# Patient Record
Sex: MALE | Race: WHITE | NOT HISPANIC OR LATINO | Employment: FULL TIME | URBAN - METROPOLITAN AREA
[De-identification: names, ages, dates, MRNs, and addresses within clinical notes are randomized per-mention and may not be internally consistent; named-entity substitution may affect disease eponyms.]

---

## 2018-02-05 ENCOUNTER — OFFICE VISIT (OUTPATIENT)
Dept: URGENT CARE | Facility: CLINIC | Age: 48
End: 2018-02-05
Payer: COMMERCIAL

## 2018-02-05 VITALS
WEIGHT: 315 LBS | SYSTOLIC BLOOD PRESSURE: 106 MMHG | TEMPERATURE: 97 F | BODY MASS INDEX: 40.43 KG/M2 | HEIGHT: 74 IN | DIASTOLIC BLOOD PRESSURE: 63 MMHG | HEART RATE: 58 BPM

## 2018-02-05 DIAGNOSIS — S62.631B OPEN DISPLACED FRACTURE OF DISTAL PHALANX OF LEFT INDEX FINGER, INITIAL ENCOUNTER: ICD-10-CM

## 2018-02-05 DIAGNOSIS — S61.319A LACERATION OF NAIL BED OF FINGER, INITIAL ENCOUNTER: ICD-10-CM

## 2018-02-05 DIAGNOSIS — Z02.83 ENCOUNTER FOR DRUG SCREENING: Primary | ICD-10-CM

## 2018-02-05 LAB
CTP QC/QA: YES
POC 5 PANEL DRUG SCREEN: NEGATIVE

## 2018-02-05 PROCEDURE — 3008F BODY MASS INDEX DOCD: CPT | Mod: S$GLB,,, | Performed by: PREVENTIVE MEDICINE

## 2018-02-05 PROCEDURE — 80305 DRUG TEST PRSMV DIR OPT OBS: CPT | Mod: QW,S$GLB,, | Performed by: PREVENTIVE MEDICINE

## 2018-02-05 PROCEDURE — 99204 OFFICE O/P NEW MOD 45 MIN: CPT | Mod: S$GLB,,, | Performed by: PREVENTIVE MEDICINE

## 2018-02-05 RX ORDER — CIPROFLOXACIN 500 MG/1
500 TABLET ORAL 2 TIMES DAILY
Qty: 20 TABLET | Refills: 0 | Status: SHIPPED | OUTPATIENT
Start: 2018-02-05 | End: 2021-05-12 | Stop reason: CLARIF

## 2018-02-05 RX ORDER — HYDROCODONE BITARTRATE AND ACETAMINOPHEN 5; 325 MG/1; MG/1
1 TABLET ORAL EVERY 6 HOURS PRN
Qty: 30 TABLET | Refills: 0 | Status: SHIPPED | OUTPATIENT
Start: 2018-02-05 | End: 2021-05-12 | Stop reason: CLARIF

## 2018-02-05 NOTE — PROGRESS NOTES
Subjective:       Patient ID: Antonino Austin is a 47 y.o. male.    Chief Complaint: Laceration and Hand Pain    NEW INJURY (02/05/2018):  Patient comes in today for laceration to left index finger. He states he was trying to secure the tailgate on his truck and his finger got caught in the clasp. He states that the whole nail is missing off of finger. He has his finger wrapped in ice and says the pain is tolerable. -LM      Laceration    The incident occurred 1 to 3 hours ago. The laceration is located on the left hand. The laceration mechanism was a metal edge. The pain is at a severity of 7/10. The pain is moderate. The pain has been constant since onset. He reports no foreign bodies present. His tetanus status is out of date.   Hand Pain    The incident occurred 1 to 3 hours ago. The incident occurred at work. The pain is present in the left fingers. The quality of the pain is described as shooting. The pain does not radiate. The pain is at a severity of 7/10. The pain is moderate. The pain has been constant since the incident. The symptoms are aggravated by movement. He has tried ice for the symptoms. The treatment provided mild relief.     Review of Systems   All other systems reviewed and are negative.      Objective:      Physical Exam   Constitutional: He is oriented to person, place, and time. He appears well-developed and well-nourished.   HENT:   Head: Normocephalic.   Eyes: Pupils are equal, round, and reactive to light.   Neck: Normal range of motion.   Cardiovascular: Normal rate.    Pulmonary/Chest: Effort normal.   Musculoskeletal: Normal range of motion.        Hands:  Marked swelling and laceration of the distal pad and nail bed of left index finger with almost complete amputation of the distal aspect of the finger. Very limited range of motion of left index finger due to significant laceration and fracture.   Neurological: He is alert and oriented to person, place, and time.   Skin: Skin is warm  and dry.   Psychiatric: He has a normal mood and affect.   Nursing note and vitals reviewed.      Assessment:       1. Encounter for drug screening    2. Open displaced fracture of distal phalanx of left index finger, initial encounter        Plan:

## 2018-02-05 NOTE — LETTER
[facility] [place]  [address], [address]  [place] [address]  Phone: [phone]  Fax: [phone]    Pt Name: Antonino Austin  Injury Date: 02/05/2018   Employee ID: [id number] Date of First Treatment: 02/05/2018   Company: [employer]            Appointment Time: 09:20 AM Arrived:  9:35 AM CST   Physician: Corey Reis MD Time Out:14:05 PM       Office Treatment: Antonino was seen today for laceration and hand pain.    Diagnoses and all orders for this visit:    Encounter for drug screening  -     POCT Rapid Drug Screen 5 Panel    Open displaced fracture of distal phalanx of left index finger, initial encounter  -     X-Ray Hand 3 view Left; Future  -     X-Ray Hand 3 View Left; Future  -     diptheria-tetanus toxoids 2-2 Lf unit/0.5 mL injection 0.5 mL; Inject 0.5 mLs into the muscle one time.  -     ciprofloxacin HCl (CIPRO) 500 MG tablet; Take 1 tablet (500 mg total) by mouth 2 (two) times daily.  -     hydrocodone-acetaminophen 5-325mg (NORCO) 5-325 mg per tablet; Take 1 tablet by mouth every 6 (six) hours as needed for Pain.       Patient Instructions: Keep dressing clean/dry/covered, Elevated affected area, Attention not to aggravate affected area, Use splint as directed    Restrictions: Home today, Limited use of left hand and arm, No lifting/pushing/pulling more than 10 lbs (Avoid getting wound site left index finger wet at work)       Return Appointment: 2/14/2018 at 0930 AM

## 2018-02-05 NOTE — PROGRESS NOTES
Subjective:       Patient ID: Antonino Austin is a 47 y.o. male.    Chief Complaint: Laceration and Hand Pain    NEW INJURY (02/05/2018):  Patient comes in today for laceration to left index finger. He states he was trying to secure the tailgate on his truck and his finger got caught in the clasp. He states that the whole nail is missing off of finger. He has his finger wrapped in ice and says the pain is tolerable. -LM      Laceration    The incident occurred 1 to 3 hours ago. The laceration is located on the left hand. The laceration mechanism was a metal edge. The pain is at a severity of 7/10. The pain is moderate. The pain has been constant since onset. He reports no foreign bodies present. His tetanus status is out of date.   Hand Pain    The incident occurred 1 to 3 hours ago. The incident occurred at work. The pain is present in the left fingers. The quality of the pain is described as shooting. The pain does not radiate. The pain is at a severity of 7/10. The pain is moderate. The pain has been constant since the incident. The symptoms are aggravated by movement. He has tried ice for the symptoms. The treatment provided mild relief.     Review of Systems   All other systems reviewed and are negative.      Objective:      Physical Exam   Constitutional: He is oriented to person, place, and time. He appears well-developed and well-nourished.   HENT:   Head: Normocephalic.   Eyes: Pupils are equal, round, and reactive to light.   Neck: Normal range of motion.   Cardiovascular: Normal rate.    Pulmonary/Chest: Effort normal.   Musculoskeletal: Normal range of motion.        Hands:  Marked swelling and laceration of the distal pad and nail bed of left index finger with almost complete amputation of the distal aspect of the finger. Very limited range of motion of left index finger due to significant laceration and fracture.   Neurological: He is alert and oriented to person, place, and time.   Skin: Skin is warm  and dry.   Psychiatric: He has a normal mood and affect.   Nursing note and vitals reviewed.      Assessment:       1. Encounter for drug screening    2. Open displaced fracture of distal phalanx of left index finger, initial encounter        Plan:       ***

## 2018-02-14 ENCOUNTER — OFFICE VISIT (OUTPATIENT)
Dept: URGENT CARE | Facility: CLINIC | Age: 48
End: 2018-02-14
Payer: COMMERCIAL

## 2018-02-14 DIAGNOSIS — S62.631B OPEN DISPLACED FRACTURE OF DISTAL PHALANX OF LEFT INDEX FINGER, INITIAL ENCOUNTER: Primary | ICD-10-CM

## 2018-02-14 PROCEDURE — 3008F BODY MASS INDEX DOCD: CPT | Mod: S$GLB,,, | Performed by: PREVENTIVE MEDICINE

## 2018-02-14 PROCEDURE — 99024 POSTOP FOLLOW-UP VISIT: CPT | Mod: S$GLB,,, | Performed by: PREVENTIVE MEDICINE

## 2018-02-14 NOTE — LETTER
[facility] [place]  [address], [address]  [facility] [address]  Phone: [phone]  Fax: [phone]    Pt Name: Antonino Austin  Injury Date: 02/05/2018   Employee ID: [id number] Date : 02/14/2018   Company: [employer]            Appointment Time: 09:15 AM Arrived:  9:30 AM CST   Physician: Corey Reis MD        Office Treatment: Antonino was seen today for hand pain.    Diagnoses and all orders for this visit:    Open displaced fracture of distal phalanx of left index finger, initial encounter  -     X-Ray Hand 3 view Left; Future       Patient Instructions: Keep dressing clean/dry/covered, Use splint as directed, (All sutures removed and steri strips applied.  Bandage and MG applied)      Restrictions:  Attention not to aggravate affected area  Regular Duty  May drive company vehicles         Return Appointment: 2/16/2018 at 12:30 PM

## 2018-02-14 NOTE — PROGRESS NOTES
Subjective:       Patient ID: Antonino Austin is a 47 y.o. male.    Chief Complaint: Hand Pain (Left idex finger)    F/u left index finger fracture and laceration (doi 2/5/2018)       Hand Pain    The incident occurred more than 1 week ago. The incident occurred at work. The pain is present in the left fingers. The quality of the pain is described as stabbing. The pain is at a severity of 3/10. The pain is moderate. The pain has been fluctuating since the incident. Pertinent negatives include no chest pain or numbness. The symptoms are aggravated by palpation and movement. He has tried NSAIDs for the symptoms. The treatment provided moderate relief.     Review of Systems   Constitution: Negative for chills, fever and weakness.   HENT: Negative for congestion, ear pain, nosebleeds and tinnitus.    Eyes: Negative for blurred vision and pain.   Cardiovascular: Negative for chest pain and palpitations.   Respiratory: Negative for cough, shortness of breath and wheezing.    Hematologic/Lymphatic: Does not bruise/bleed easily.   Skin: Positive for nail changes. Negative for dry skin, itching, poor wound healing, rash and skin cancer.   Musculoskeletal: Negative for back pain, joint pain, muscle weakness, neck pain and stiffness.   Gastrointestinal: Negative for abdominal pain, constipation, diarrhea, nausea and vomiting.   Genitourinary: Negative for dysuria and hematuria.   Neurological: Negative for dizziness, headaches, numbness and seizures.   Allergic/Immunologic: Negative for hives.   All other systems reviewed and are negative.      Objective:      Physical Exam   Constitutional: He is oriented to person, place, and time. He appears well-developed and well-nourished.   HENT:   Head: Normocephalic and atraumatic.   Eyes: EOM are normal. Pupils are equal, round, and reactive to light.   Neck: Normal range of motion.   Cardiovascular: Normal rate.    Pulmonary/Chest: Effort normal.   Musculoskeletal:        Left  hand: He exhibits decreased range of motion, tenderness, bony tenderness, laceration and swelling. He exhibits normal capillary refill and no deformity.        Hands:  Wound site tip of left index finger healing well. No signs of infection. Now has pain with limited range of motion of the distal aspect of left index finger.   Neurological: He is alert and oriented to person, place, and time.   Skin: Skin is warm and dry.   Psychiatric: He has a normal mood and affect.   Nursing note and vitals reviewed.    repeat xrays left hand reveal fracture site of the distal phalanx healing in good position.  Assessment:       1. Open displaced fracture of distal phalanx of left index finger, initial encounter        Plan:            Patient Instructions: Keep dressing clean/dry/covered, Use splint as directed, Attention not to aggravate affected area (All sutures removed and steri strips applied.  Bandage and MG applied)   Restrictions:  (May drive company vehicles.)  Follow-up in about 2 days (around 2/16/2018).

## 2018-02-14 NOTE — LETTER
[facility] [place] [address], [address]  [facility] [address]  Phone: [phone]  Fax: [phone]    Pt Name: Antonino Austin  Injury Date: 02/05/2018   Employee ID: [id number] Date  02/14/2018   Company: [employer]            Appointment Time: 09:15 AM Arrived:  9:30 AM CST   Physician: Corey Reis MD Time out: 11:58 AM       Office Treatment: Antonino was seen today for hand pain.    Diagnoses and all orders for this visit:    Open displaced fracture of distal phalanx of left index finger, initial encounter  -     X-Ray Hand 3 view Left; Future       Patient Instructions: Keep dressing clean/dry/covered, Use splint as directed, (All sutures removed and steri strips applied.  Bandage and MG applied)      Restrictions:  Attention not to aggravate affected area  Regular Duty  May drive company vehicles       Return Appointment: 2/16/2018 at 12:30 PM

## 2018-02-16 ENCOUNTER — OFFICE VISIT (OUTPATIENT)
Dept: URGENT CARE | Facility: CLINIC | Age: 48
End: 2018-02-16
Payer: COMMERCIAL

## 2018-02-16 DIAGNOSIS — S62.631D OPEN DISPLACED FRACTURE OF DISTAL PHALANX OF LEFT INDEX FINGER WITH ROUTINE HEALING, SUBSEQUENT ENCOUNTER: Primary | ICD-10-CM

## 2018-02-16 DIAGNOSIS — S61.319D LACERATION OF FINGER NAIL BED, SUBSEQUENT ENCOUNTER: ICD-10-CM

## 2018-02-16 PROCEDURE — 99213 OFFICE O/P EST LOW 20 MIN: CPT | Mod: S$GLB,,, | Performed by: PREVENTIVE MEDICINE

## 2018-02-16 PROCEDURE — 3008F BODY MASS INDEX DOCD: CPT | Mod: S$GLB,,, | Performed by: PREVENTIVE MEDICINE

## 2018-02-16 NOTE — PROGRESS NOTES
Subjective:       Patient ID: Antonino Austin is a 47 y.o. male.    Chief Complaint: Hand Pain (left index finger)    F/u left index finger fracture and laceration (doi 2/5/2018)       Hand Pain    The incident occurred more than 1 week ago. The incident occurred at work. The pain is present in the left fingers. The quality of the pain is described as stabbing and aching. The pain is at a severity of 2/10. The pain is mild. The pain has been intermittent since the incident. Pertinent negatives include no chest pain. He has tried NSAIDs for the symptoms. The treatment provided moderate relief.     Review of Systems   Constitution: Negative for chills and fever.   HENT: Negative for sore throat.    Eyes: Negative for blurred vision.   Cardiovascular: Negative for chest pain.   Respiratory: Negative for shortness of breath.    Skin: Negative for rash.   Musculoskeletal: Negative for back pain and joint pain.   Gastrointestinal: Negative for abdominal pain, diarrhea, nausea and vomiting.   Neurological: Negative for headaches.   Psychiatric/Behavioral: The patient is not nervous/anxious.    All other systems reviewed and are negative.      Objective:      Physical Exam   Constitutional: He is oriented to person, place, and time. He appears well-developed and well-nourished.   HENT:   Head: Normocephalic and atraumatic.   Eyes: EOM are normal. Pupils are equal, round, and reactive to light.   Neck: Normal range of motion.   Cardiovascular: Normal rate.    Pulmonary/Chest: Effort normal.   Musculoskeletal:        Left hand: He exhibits decreased range of motion, tenderness, bony tenderness, laceration and swelling. He exhibits normal capillary refill and no deformity.        Hands:  Wound site tip of left index finger healing well. No signs of infection. Now has pain with limited range of motion of the distal aspect of left index finger.   Neurological: He is alert and oriented to person, place, and time.   Skin: Skin is  warm and dry.   Psychiatric: He has a normal mood and affect.   Nursing note and vitals reviewed.      Assessment:       1. Open displaced fracture of distal phalanx of left index finger with routine healing, subsequent encounter    2. 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 Keep wound area clean and dry at work.)  Follow-up in about 5 days (around 2/21/2018).

## 2018-02-16 NOTE — LETTER
[facility] [place] [address], [address]  [place] [address]  Phone: [phone]  Fax: [phone]    Pt Name: Antonino Austin  Injury Date: 02/05/2018   Employee ID: [id number] Date: 02/16/2018   Company:[[employer]            Appointment Time: 12:130 PM Arrived:  11:01 AM   Physician: Corey Reis MD Time out: 12:09 PM       Office Treatment: Antonino was seen today for hand pain.    Diagnoses and all orders for this visit:    Open displaced fracture of distal phalanx of left index finger with routine healing, subsequent encounter    Laceration of finger nail bed, subsequent encounter       Patient Instructions: Keep dressing clean/dry/covered, Use splint as directed, Attention not to aggravate affected area    Restrictions: NONE  Regular Duty  May drive company vehicles.  Keep wound area clean and dry at work.       Return Appointment: 2/21/2018 at 12:00 PM

## 2018-02-21 ENCOUNTER — OFFICE VISIT (OUTPATIENT)
Dept: URGENT CARE | Facility: CLINIC | Age: 48
End: 2018-02-21
Payer: COMMERCIAL

## 2018-02-21 DIAGNOSIS — S62.631D OPEN DISPLACED FRACTURE OF DISTAL PHALANX OF LEFT INDEX FINGER WITH ROUTINE HEALING, SUBSEQUENT ENCOUNTER: Primary | ICD-10-CM

## 2018-02-21 DIAGNOSIS — L03.012 CELLULITIS OF LEFT INDEX FINGER: ICD-10-CM

## 2018-02-21 DIAGNOSIS — S61.319D LACERATION OF FINGER NAIL BED, SUBSEQUENT ENCOUNTER: ICD-10-CM

## 2018-02-21 PROCEDURE — 99213 OFFICE O/P EST LOW 20 MIN: CPT | Mod: S$GLB,,, | Performed by: PREVENTIVE MEDICINE

## 2018-02-21 PROCEDURE — 3008F BODY MASS INDEX DOCD: CPT | Mod: S$GLB,,, | Performed by: PREVENTIVE MEDICINE

## 2018-02-21 RX ORDER — DOXYCYCLINE HYCLATE 100 MG
100 TABLET ORAL 2 TIMES DAILY
Qty: 20 TABLET | Refills: 0 | Status: SHIPPED | OUTPATIENT
Start: 2018-02-21 | End: 2018-02-21 | Stop reason: SDUPTHER

## 2018-02-21 RX ORDER — DOXYCYCLINE HYCLATE 100 MG
100 TABLET ORAL 2 TIMES DAILY
Qty: 20 TABLET | Refills: 0 | Status: SHIPPED | OUTPATIENT
Start: 2018-02-21 | End: 2021-05-12 | Stop reason: CLARIF

## 2018-02-21 NOTE — LETTER
[facility] [place]  [address], [address]  [facility] [address]  Phone: [phone]  Fax: [phone]    Pt Name: Antonino Austin  Injury Date: 02/05/2018   Employee ID: [id number] Date: 02/21/2018   Company: [employer]            Appointment Time: 12:00 PM Arrived: 12:00 PM CST   Physician: Corey Reis MD Time out: 1:00 PM       Office Treatment: Antonino was seen today for hand pain.    Diagnoses and all orders for this visit:    Open displaced fracture of distal phalanx of left index finger with routine healing, subsequent encounter    Laceration of finger nail bed, subsequent encounter    Other orders  -     doxycycline (VIBRA-TABS) 100 MG tablet; Take 1 tablet (100 mg total) by mouth 2 (two) times daily.       Patient Instructions: Use splint as directed, Keep dressing clean/dry/covered, Change bandage only when wet or dirty (Must not get wound site wet at work or at home. )      Restrictions: LIGHT DUTY  Limited use of left hand and arm   May drive company vehicles but has to keep wound area clean and dry.       Return Appointment: 2/26/2018 at 10:30AM

## 2018-02-21 NOTE — PROGRESS NOTES
Subjective:       Patient ID: Antonino Austin is a 47 y.o. male.    Chief Complaint: Hand Pain (left index finger)    F/u left index finger fracture and laceration (doi 2/5/2018) Pt states he has minimal pain. Not taking anything for pain. He keeps his finger cover with a 4 tail bandaid and metal guard.      Hand Pain    The incident occurred more than 1 week ago. The incident occurred at work. The pain is present in the left fingers. The pain is mild. The pain has been intermittent since the incident. Pertinent negatives include no chest pain or numbness. The symptoms are aggravated by palpation. He has tried nothing for the symptoms.     Review of Systems   Constitution: Negative for chills, fever and weakness.   HENT: Negative for congestion, ear pain, nosebleeds and tinnitus.    Eyes: Negative for blurred vision and pain.   Cardiovascular: Negative for chest pain and palpitations.   Respiratory: Negative for cough, shortness of breath and wheezing.    Hematologic/Lymphatic: Does not bruise/bleed easily.   Skin: Positive for color change and nail changes. Negative for dry skin, itching, poor wound healing, rash and skin cancer.   Musculoskeletal: Negative for back pain, joint pain, muscle weakness, neck pain and stiffness.   Gastrointestinal: Negative for abdominal pain, constipation, diarrhea, nausea and vomiting.   Genitourinary: Negative for dysuria and hematuria.   Neurological: Negative for dizziness, headaches, numbness and seizures.   Allergic/Immunologic: Negative for hives.   All other systems reviewed and are negative.      Objective:      Physical Exam   Constitutional: He is oriented to person, place, and time. He appears well-developed and well-nourished.   HENT:   Head: Normocephalic and atraumatic.   Eyes: EOM are normal. Pupils are equal, round, and reactive to light.   Neck: Normal range of motion.   Cardiovascular: Normal rate.    Pulmonary/Chest: Effort normal.   Musculoskeletal:        Left  hand: He exhibits decreased range of motion, tenderness, bony tenderness, laceration and swelling. He exhibits normal capillary refill and no deformity.        Hands:  Wound site tip of left index finger healing well. Now has pain with limited range of motion of the distal aspect of left index finger. Wound site also appears to have gotten wet again. This will require prophylactic antibiotic medication.   Neurological: He is alert and oriented to person, place, and time.   Skin: Skin is warm and dry.   Psychiatric: He has a normal mood and affect.   Nursing note and vitals reviewed.      Assessment:       1. Open displaced fracture of distal phalanx of left index finger with routine healing, subsequent encounter    2. Laceration of finger nail bed, subsequent encounter    3. Cellulitis of left index finger        Plan:           Medications Ordered This Encounter      doxycycline (VIBRA-TABS) 100 MG tablet          Sig: Take 1 tablet (100 mg total) by mouth 2 (two) times daily.          Dispense:  20 tablet          Refill:  0  Patient Instructions: Use splint as directed, Keep dressing clean/dry/covered, Change bandage only when wet or dirty (Must not get wound site wet at work or at home. )   Restrictions: Limited use of left hand and arm (May drive company vehicles but has to keep wound area clean and dry.)  Follow-up in about 5 days (around 2/26/2018).

## 2018-02-26 ENCOUNTER — OFFICE VISIT (OUTPATIENT)
Dept: URGENT CARE | Facility: CLINIC | Age: 48
End: 2018-02-26
Payer: COMMERCIAL

## 2018-02-26 DIAGNOSIS — S62.631D OPEN DISPLACED FRACTURE OF DISTAL PHALANX OF LEFT INDEX FINGER WITH ROUTINE HEALING, SUBSEQUENT ENCOUNTER: Primary | ICD-10-CM

## 2018-02-26 DIAGNOSIS — L03.012 CELLULITIS OF LEFT INDEX FINGER: ICD-10-CM

## 2018-02-26 DIAGNOSIS — S61.319D LACERATION OF FINGER NAIL BED, SUBSEQUENT ENCOUNTER: ICD-10-CM

## 2018-02-26 PROCEDURE — 3008F BODY MASS INDEX DOCD: CPT | Mod: S$GLB,,, | Performed by: PREVENTIVE MEDICINE

## 2018-02-26 PROCEDURE — 99213 OFFICE O/P EST LOW 20 MIN: CPT | Mod: S$GLB,,, | Performed by: PREVENTIVE MEDICINE

## 2018-02-26 NOTE — LETTER
[facility] [place] [address], [address]  [facility] [address]  Phone: [phone]  Fax: [phone]    Pt Name: Antonino Akbar Date: 02/05/2018   Employee ID: [id number] Date 02/26/2018   Company: [employer]            Appointment Time: 10:30 AM Arrived: 10:30 AM CST   Physician: Corey Reis MD Time out: 11:20 AM       Office Treatment: Antonino was seen today for hand pain.    Diagnoses and all orders for this visit:    Open displaced fracture of distal phalanx of left index finger with routine healing, subsequent encounter    Laceration of finger nail bed, subsequent encounter    Cellulitis of left index finger       Patient Instructions: Keep dressing clean/dry/covered, Use splint as directed, Change bandage only when wet or dirty    Restrictions: Limited use of left hand and arm (May drive company vehicles)       Return Appointment: 3/2/2018 at 10:00 AM

## 2018-02-26 NOTE — PROGRESS NOTES
Subjective:       Patient ID: Antonino Austin is a 47 y.o. male.    Chief Complaint: Hand Pain (left index finger)    F/u left index finger fracture and laceration (doi 2/5/2018) Pt states he has minimal pain. Not taking anything for pain. Steri strips are in place, Pt wearing metal guard. Pt reports stiffness, and painful when he tries to bend his finger.  ajd      Hand Pain    The incident occurred more than 1 week ago. The incident occurred at work. The injury mechanism was a direct blow. The pain is present in the left fingers. The quality of the pain is described as aching. The pain does not radiate. The pain is mild. The pain has been intermittent since the incident. Pertinent negatives include no chest pain or numbness. The symptoms are aggravated by movement. He has tried nothing for the symptoms.     Review of Systems   Constitution: Negative for chills, fever and weakness.   HENT: Negative for congestion, ear pain and nosebleeds.    Eyes: Negative for blurred vision and pain.   Cardiovascular: Negative for chest pain and palpitations.   Respiratory: Negative for cough, shortness of breath and wheezing.    Skin: Negative for dry skin, itching and rash.   Musculoskeletal: Positive for joint pain and stiffness. Negative for arthritis, back pain, gout, joint swelling, muscle weakness and neck pain.   Gastrointestinal: Negative for abdominal pain, constipation, diarrhea, nausea and vomiting.   Genitourinary: Negative for dysuria, frequency and hematuria.   Neurological: Negative for dizziness, headaches, numbness and seizures.   Allergic/Immunologic: Negative for hives.   All other systems reviewed and are negative.      Objective:      Physical Exam   Constitutional: He is oriented to person, place, and time. He appears well-developed and well-nourished.   HENT:   Head: Normocephalic and atraumatic.   Eyes: EOM are normal. Pupils are equal, round, and reactive to light.   Neck: Normal range of motion.    Cardiovascular: Normal rate.    Pulmonary/Chest: Effort normal.   Musculoskeletal: Normal range of motion.        Left hand: He exhibits tenderness, bony tenderness, laceration and swelling. He exhibits normal capillary refill and no deformity.        Hands:  Wound site tip of left index finger healing well. No signs of infection. Now has pain with limited range of motion of the distal aspect of left index finger.   Neurological: He is alert and oriented to person, place, and time.   Skin: Skin is warm and dry.   Psychiatric: He has a normal mood and affect.   Nursing note and vitals reviewed.      Assessment:       1. Open displaced fracture of distal phalanx of left index finger with routine healing, subsequent encounter    2. Laceration of finger nail bed, subsequent encounter    3. Cellulitis of left index finger        Plan:            Patient Instructions: Keep dressing clean/dry/covered, Use splint as directed, Change bandage only when wet or dirty   Restrictions: Limited use of left hand and arm (May drive company vehicles)  Follow-up in about 4 days (around 3/2/2018).

## 2018-03-02 ENCOUNTER — OFFICE VISIT (OUTPATIENT)
Dept: URGENT CARE | Facility: CLINIC | Age: 48
End: 2018-03-02
Payer: COMMERCIAL

## 2018-03-02 DIAGNOSIS — S62.631D OPEN DISPLACED FRACTURE OF DISTAL PHALANX OF LEFT INDEX FINGER WITH ROUTINE HEALING, SUBSEQUENT ENCOUNTER: ICD-10-CM

## 2018-03-02 DIAGNOSIS — S61.319D LACERATION OF FINGER NAIL BED, SUBSEQUENT ENCOUNTER: Primary | ICD-10-CM

## 2018-03-02 DIAGNOSIS — L03.012 CELLULITIS OF LEFT INDEX FINGER: ICD-10-CM

## 2018-03-02 PROCEDURE — 99213 OFFICE O/P EST LOW 20 MIN: CPT | Mod: S$GLB,,, | Performed by: PREVENTIVE MEDICINE

## 2018-03-02 NOTE — LETTER
[facility] [place] [address], [address]  [facility] [address]  Phone: [phone]  Fax: [phone]    Pt Name: Antonino Austin  Injury Date: 02/05/2018   Employee ID:[id number] Date  03/02/2018   [employer]            Appointment Time: 09:45 AM Arrived: 10:00 AM CST   Physician: Corey Reis MD Time out: 11:25 AM       Office Treatment: Antonino was seen today for hand pain.    Diagnoses and all orders for this visit:    Laceration of finger nail bed, subsequent encounter  Open displaced fracture of distal phalanx of left index finger with routine healing, subsequent encounter  -     X-Ray Hand 3 view Left; Future  Cellulitis of left index finger     Patient Instructions: Keep dressing clean/dry/covered, Use splint as directed (Use bandaids to cover after cleaning wound at home daily.)      Restrictions: LIGHT DUTY   Limited use of left hand and arm       Return Appointment: 3/12/2018 at 8:30 AM

## 2018-03-02 NOTE — PROGRESS NOTES
"Subjective:       Patient ID: Antonino Austin is a 47 y.o. male.    Chief Complaint: Hand Pain (left index finger)    F/u left index finger fracture and laceration (doi 2/5/2018) No changes since last office visit. "Pt states he has minimal pain. Not taking anything for pain. Steri strips are in place, Pt wearing metal guard. Pt reports stiffness, and painful when he tries to bend his finger."  ajd      Hand Pain    The incident occurred more than 1 week ago. The incident occurred at work. The injury mechanism was a direct blow. The pain is present in the left fingers. The quality of the pain is described as aching. The pain does not radiate. The pain is mild. The pain has been intermittent since the incident. Pertinent negatives include no chest pain or numbness. The symptoms are aggravated by movement. He has tried nothing for the symptoms.     Review of Systems   Constitution: Negative for chills, fever and weakness.   HENT: Negative for congestion, ear pain and nosebleeds.    Eyes: Negative for blurred vision and pain.   Cardiovascular: Negative for chest pain and palpitations.   Respiratory: Negative for cough, shortness of breath and wheezing.    Skin: Negative for dry skin, itching and rash.   Musculoskeletal: Positive for joint pain and stiffness. Negative for arthritis, back pain, gout, joint swelling, muscle weakness and neck pain.   Gastrointestinal: Negative for abdominal pain, constipation, diarrhea, nausea and vomiting.   Genitourinary: Negative for dysuria, frequency and hematuria.   Neurological: Negative for dizziness, headaches, numbness and seizures.   Allergic/Immunologic: Negative for hives.   All other systems reviewed and are negative.      Objective:      Physical Exam   Constitutional: He is oriented to person, place, and time. He appears well-developed and well-nourished.   HENT:   Head: Normocephalic and atraumatic.   Eyes: EOM are normal. Pupils are equal, round, and reactive to light. "   Neck: Normal range of motion.   Cardiovascular: Normal rate.    Pulmonary/Chest: Effort normal.   Musculoskeletal: Normal range of motion.        Left hand: He exhibits tenderness, bony tenderness, laceration and swelling. He exhibits normal capillary refill and no deformity.        Hands:  Wound site tip of left index finger healing well. No signs of infection. Now has pain with limited range of motion of the distal aspect of left index finger.   Neurological: He is alert and oriented to person, place, and time.   Skin: Skin is warm and dry.   Psychiatric: He has a normal mood and affect.   Nursing note and vitals reviewed.    Repeat xray reveals healing fracture fragments in good position. No callous formation noted at this time.  Assessment:       1. Laceration of finger nail bed, subsequent encounter    2. Open displaced fracture of distal phalanx of left index finger with routine healing, subsequent encounter    3. Cellulitis of left index finger        Plan:            Patient Instructions: Keep dressing clean/dry/covered, Use splint as directed (Use bandaids to cover after cleaning wound at home daily.)   Restrictions: Limited use of left hand and arm  Follow-up in about 10 days (around 3/12/2018).

## 2018-03-12 ENCOUNTER — OFFICE VISIT (OUTPATIENT)
Dept: URGENT CARE | Facility: CLINIC | Age: 48
End: 2018-03-12
Payer: COMMERCIAL

## 2018-03-12 DIAGNOSIS — L03.012 CELLULITIS OF LEFT INDEX FINGER: ICD-10-CM

## 2018-03-12 DIAGNOSIS — S61.319D LACERATION OF FINGER NAIL BED, SUBSEQUENT ENCOUNTER: ICD-10-CM

## 2018-03-12 DIAGNOSIS — S62.631D OPEN DISPLACED FRACTURE OF DISTAL PHALANX OF LEFT INDEX FINGER WITH ROUTINE HEALING, SUBSEQUENT ENCOUNTER: Primary | ICD-10-CM

## 2018-03-12 PROCEDURE — 99213 OFFICE O/P EST LOW 20 MIN: CPT | Mod: S$GLB,,, | Performed by: PREVENTIVE MEDICINE

## 2018-03-12 NOTE — PROGRESS NOTES
Subjective:       Patient ID: Antonino Austin is a 47 y.o. male.    Chief Complaint: Laceration (left index finger)    F/u left index finger fracture and laceration (doi 2/5/2018)      Laceration    The incident occurred more than 1 week ago. The laceration is located on the left hand. The patient is experiencing no pain. His tetanus status is UTD.     Review of Systems   Constitution: Negative for chills, fever and weakness.   HENT: Negative for congestion, ear pain, nosebleeds and tinnitus.    Eyes: Negative for blurred vision and pain.   Cardiovascular: Negative for chest pain and palpitations.   Respiratory: Negative for cough, shortness of breath and wheezing.    Hematologic/Lymphatic: Does not bruise/bleed easily.   Skin: Positive for color change and dry skin. Negative for itching, poor wound healing, rash and skin cancer.   Musculoskeletal: Negative for back pain, joint pain, muscle weakness, neck pain and stiffness.   Gastrointestinal: Negative for abdominal pain, constipation, diarrhea, nausea and vomiting.   Genitourinary: Negative for dysuria and hematuria.   Neurological: Negative for dizziness, headaches, numbness and seizures.   Allergic/Immunologic: Negative for hives.   All other systems reviewed and are negative.      Objective:      Physical Exam   Constitutional: He is oriented to person, place, and time. He appears well-developed and well-nourished.   HENT:   Head: Normocephalic and atraumatic.   Eyes: EOM are normal. Pupils are equal, round, and reactive to light.   Neck: Normal range of motion.   Cardiovascular: Normal rate.    Pulmonary/Chest: Effort normal.   Musculoskeletal: Normal range of motion.        Left hand: He exhibits tenderness, bony tenderness, laceration and swelling. He exhibits normal capillary refill and no deformity.        Hands:  Wound site tip of left index finger healing well. No signs of infection. Now has pain with limited range of motion of the distal aspect of left  index finger.   Neurological: He is alert and oriented to person, place, and time.   Skin: Skin is warm and dry.   Psychiatric: He has a normal mood and affect.   Nursing note and vitals reviewed.      Assessment:       1. Open displaced fracture of distal phalanx of left index finger with routine healing, subsequent encounter    2. Cellulitis of left index finger    3. Laceration of finger nail bed, subsequent encounter        Plan:          Repeat xray reveals healing fracture site tip of left index finger.    Patient Instructions: Use splint as directed, Keep dressing clean/dry/covered (cleanse wound site at home daily)   Restrictions: Regular Duty  Follow-up in about 4 weeks (around 4/9/2018).

## 2018-04-09 ENCOUNTER — OFFICE VISIT (OUTPATIENT)
Dept: URGENT CARE | Facility: CLINIC | Age: 48
End: 2018-04-09
Payer: COMMERCIAL

## 2018-04-09 DIAGNOSIS — S61.319D LACERATION OF FINGER NAIL BED, SUBSEQUENT ENCOUNTER: ICD-10-CM

## 2018-04-09 DIAGNOSIS — S62.631D OPEN DISPLACED FRACTURE OF DISTAL PHALANX OF LEFT INDEX FINGER WITH ROUTINE HEALING, SUBSEQUENT ENCOUNTER: Primary | ICD-10-CM

## 2018-04-09 PROCEDURE — 99213 OFFICE O/P EST LOW 20 MIN: CPT | Mod: S$GLB,,, | Performed by: NURSE PRACTITIONER

## 2018-04-09 NOTE — LETTER
[facility] [place]  [address], [address]  [facility] [address]  Phone: [phone]  Fax: [phone]    Pt Name: Antonino Austin  Injury Date: 02/05/2018   Employee ID:  Date of First Treatment: 04/09/2018   Company: [employer]            Appointment Time: 08:30 AM Arrived:  8:34 AM CDT   Physician: Sheela Norris NP Time Out: 9:35 AM CDT       Office Treatment: Antonino was seen today for hand pain.    Diagnoses and all orders for this visit:    Open displaced fracture of distal phalanx of left index finger with routine healing, subsequent encounter  -     X-Ray Hand 3 View Left; Future    Laceration of finger nail bed, subsequent encounter       Patient Instructions:  (range of motion exercises daily left index finger)    Restrictions:   Regular Duty,   Discharged from Occupational Health

## 2018-04-09 NOTE — PATIENT INSTRUCTIONS
Finger Fracture, Closed  You have a broken finger (fracture). This causes local pain, swelling, and bruising. This injury usually takes about 4 to 6 weeks to heal, but can take longer in some cases. Finger injuries are often treated with a splint or cast, or by taping the injured finger to the next one (manuel taping). This protects the injured finger and holds the bone in position while it heals. More serious fractures may need surgery.     If the fingernail has been severely injured, it will probably fall off in 1 to 2 weeks. A new fingernail will usually start to grow back within a month.  Home care  Follow these guidelines when caring for yourself at home:  · Keep your hand elevated to reduce pain and swelling. When sitting or lying down keep your arm above the level of your heart. You can do this by placing your arm on a pillow that rests on your chest or on a pillow at your side. This is most important during the first 2 days (48 hours) after the injury.  · Put an ice pack on the injured area. Do this for 20 minutes every 1 to 2 hours the first day for pain relief. You can make an ice pack by wrapping a plastic bag of ice cubes in a thin towel. As the ice melts, be careful that the cast or splint doesnt get wet. Continue using the ice pack 3 to 4 times a day until the pain and swelling go away.  · Keep the cast or splint completely dry at all times. Bathe with your cast or splint out of the water. Protect it with a large plastic bag, rubber-banded at the top end. If a fiberglass cast or splint gets wet, you can dry it with a hair dryer.  · If manuel tape was put on and it becomes wet or dirty, change it. You may replace it with paper, plastic, or cloth tape. Cloth tape and paper tapes must be kept dry. Keep the manuel tape in place for at least 4 weeks.  · You may use acetaminophen or ibuprofen to control pain, unless another pain medicine was prescribed. If you have chronic liver or kidney disease, talk with  your healthcare provider before using these medicines. Also talk with your provider if youve had a stomach ulcer or gastrointestinal bleeding.  · Dont put creams or objects under the cast if you have itching.  Follow-up care  Follow up with your healthcare provider, or as advised. This is to make sure the bone is healing the way it should.  X-rays may be taken. You will be told of any new findings that may affect your care.  When to seek medical advice  Call your healthcare provider right away if any of these occur:  · The plaster cast or splint becomes wet or soft  · The cast or splint cracks  · The fiberglass cast or splint stays wet for more than 24 hours  · Pain or swelling gets worse  · Redness, warmth, swelling, drainage from the wound, or foul odor from a cast or splint  · Finger becomes more cold, blue, numb, or tingly  · You cant move your finger  · The skin around the cast or splint becomes red  · Fever of 100.4ºF (38ºC) or higher, or as directed by your healthcare provider  Date Last Reviewed: 2/1/2017 © 2000-2017 [employer]. [address] [place] [id number]. All rights reserved. This information is not intended as a substitute for professional medical care. Always follow your healthcare professional's instructions.

## 2018-04-09 NOTE — PROGRESS NOTES
Subjective:       Patient ID: Antonino Austin is a 47 y.o. male.    Chief Complaint: Hand Pain (left index)    Wound healing to left index finger.  Reports some TTP at the tip of finger.  Limited range of motion reported      Hand Pain    Incident onset: 02/05/2018. The incident occurred at work. The injury mechanism was a direct blow. The pain is present in the left hand and left fingers. Quality: tender to touch. The pain is at a severity of 0/10. The patient is experiencing no pain. Pertinent negatives include no numbness.     Review of Systems   Constitution: Negative for chills and fever.   Skin: Positive for color change. Negative for dry skin, itching and poor wound healing.   Musculoskeletal: Positive for joint swelling and stiffness.   Gastrointestinal: Negative for constipation, heartburn and nausea.   Neurological: Negative for numbness, sensory change and tremors.   Psychiatric/Behavioral: Negative for altered mental status. The patient does not have insomnia.    All other systems reviewed and are negative.      Objective:      Physical Exam   Constitutional: He is oriented to person, place, and time. He appears well-developed and well-nourished.   Cardiovascular: Normal rate and regular rhythm.    Pulmonary/Chest: Effort normal and breath sounds normal.   Abdominal: Soft. Bowel sounds are normal.   Musculoskeletal: He exhibits tenderness.        Left hand: He exhibits decreased range of motion, tenderness and bony tenderness. Normal sensation noted. Decreased strength noted.        Hands:  Neurological: He is alert and oriented to person, place, and time. No cranial nerve deficit or sensory deficit. GCS eye subscore is 4. GCS verbal subscore is 5. GCS motor subscore is 6.   Skin: Skin is warm, dry and intact. Capillary refill takes 2 to 3 seconds.       Assessment:       1. Open displaced fracture of distal phalanx of left index finger with routine healing, subsequent encounter    2. Laceration of  finger nail bed, subsequent encounter        Plan:       Antonino was seen today for hand pain.    Diagnoses and all orders for this visit:    Open displaced fracture of distal phalanx of left index finger with routine healing, subsequent encounter  -     X-Ray Hand 3 View Left; Future    Laceration of finger nail bed, subsequent encounter    X-ray Hand 3 View Left    Result Date: 3/12/2018  Position and alignment of the fracture of the distal tuft of the second finger it is similar to our study of 10 days ago and no other significant findings.     Stable findings Electronically signed by: Jayro Junior MD Date:     03/12/18 Time:    09:50     X-ray Hand 3 View Left    Result Date: 4/9/2018  EXAMINATION: XR HAND COMPLETE 3 VIEW LEFT CLINICAL HISTORY: . Displaced fracture of distal phalanx of left index finger, subsequent encounter for fracture with routine healing TECHNIQUE: PA, lateral, and oblique views of the left hand were performed. COMPARISON: 03/12/2018.  03/02/2018.  02/14/2018.  02/05/2018. FINDINGS: The patient has suffered an open fracture of the distal tuft of the distal phalanx of the index finger on 02/05/2018.  As seen on subsequent studies, fracture fragments are in satisfactory position and alignment.  However fracture lucency remains conspicuous.  I do not identify callus at the site of fracture. No other unusual finding identified.  No significant change compared to the recent examination of 03/12/2018.     Please see above. Electronically signed by: Krystin Koenig MD Date:    04/09/2018 Time:    09:22    X-ray Hand 3 View Left    Result Date: 3/12/2018  Position and alignment of the fracture of the distal tuft of the second finger it is similar to our study of 10 days ago and no other significant findings.     Stable findings Electronically signed by: Jayro Junior MD Date:     03/12/18 Time:    09:50     Patient Instructions:  (range of motion exercises daily left index finger)    Restrictions: Regular Duty, Discharged from Occupational Health  Follow-up if symptoms worsen or fail to improve.

## 2021-05-12 ENCOUNTER — HOSPITAL ENCOUNTER (INPATIENT)
Facility: HOSPITAL | Age: 51
LOS: 3 days | Discharge: HOME | DRG: 189 | End: 2021-05-15
Attending: EMERGENCY MEDICINE | Admitting: INTERNAL MEDICINE
Payer: COMMERCIAL

## 2021-05-12 DIAGNOSIS — J45.909 CHRONIC ASTHMA, UNSPECIFIED ASTHMA SEVERITY, UNSPECIFIED WHETHER COMPLICATED, UNSPECIFIED WHETHER PERSISTENT: ICD-10-CM

## 2021-05-12 DIAGNOSIS — J44.1 COPD WITH ACUTE EXACERBATION: ICD-10-CM

## 2021-05-12 DIAGNOSIS — E66.2 OBESITY HYPOVENTILATION SYNDROME: ICD-10-CM

## 2021-05-12 DIAGNOSIS — J18.9 PNEUMONIA OF RIGHT LOWER LOBE DUE TO INFECTIOUS ORGANISM: Primary | ICD-10-CM

## 2021-05-12 DIAGNOSIS — Z72.0 TOBACCO ABUSE: ICD-10-CM

## 2021-05-12 DIAGNOSIS — R06.02 SHORTNESS OF BREATH: ICD-10-CM

## 2021-05-12 DIAGNOSIS — R06.2 WHEEZING: ICD-10-CM

## 2021-05-12 DIAGNOSIS — E66.01 MORBID OBESITY: ICD-10-CM

## 2021-05-12 DIAGNOSIS — I27.81 COR PULMONALE: ICD-10-CM

## 2021-05-12 DIAGNOSIS — R15.1 FECAL SMEARING: ICD-10-CM

## 2021-05-12 DIAGNOSIS — J41.1 MUCOPURULENT CHRONIC BRONCHITIS: ICD-10-CM

## 2021-05-12 DIAGNOSIS — G47.33 OSA (OBSTRUCTIVE SLEEP APNEA): ICD-10-CM

## 2021-05-12 LAB
ALBUMIN SERPL BCP-MCNC: 3.8 G/DL (ref 3.5–5.2)
ALP SERPL-CCNC: 102 U/L (ref 38–126)
ALT SERPL W/O P-5'-P-CCNC: 28 U/L (ref 10–44)
ANION GAP SERPL CALC-SCNC: 2 MMOL/L (ref 8–16)
AST SERPL-CCNC: 22 U/L (ref 15–46)
BACTERIA #/AREA URNS AUTO: NORMAL /HPF
BASOPHILS # BLD AUTO: 0.06 K/UL (ref 0–0.2)
BASOPHILS NFR BLD: 0.4 % (ref 0–1.9)
BILIRUB SERPL-MCNC: 0.8 MG/DL (ref 0.1–1)
BILIRUB UR QL STRIP: NEGATIVE
BNP SERPL-MCNC: <10 PG/ML (ref 0–99)
CALCIUM SERPL-MCNC: 8.8 MG/DL (ref 8.7–10.5)
CHLORIDE SERPL-SCNC: 100 MMOL/L (ref 95–110)
CK SERPL-CCNC: 124 U/L (ref 55–170)
CLARITY UR REFRACT.AUTO: CLEAR
CO2 SERPL-SCNC: 36 MMOL/L (ref 23–29)
COLOR UR AUTO: ABNORMAL
CREAT SERPL-MCNC: 0.54 MG/DL (ref 0.5–1.4)
CRP SERPL-MCNC: 5.3 MG/DL (ref 0–1)
DIFFERENTIAL METHOD: ABNORMAL
EOSINOPHIL # BLD AUTO: 0.2 K/UL (ref 0–0.5)
EOSINOPHIL NFR BLD: 1.4 % (ref 0–8)
ERYTHROCYTE [DISTWIDTH] IN BLOOD BY AUTOMATED COUNT: 17 % (ref 11.5–14.5)
EST. GFR  (AFRICAN AMERICAN): >60 ML/MIN/1.73 M^2
EST. GFR  (NON AFRICAN AMERICAN): >60 ML/MIN/1.73 M^2
FERRITIN SERPL-MCNC: 167 NG/ML (ref 20–300)
GLUCOSE SERPL-MCNC: 110 MG/DL (ref 70–110)
GLUCOSE UR QL STRIP: NEGATIVE
HCT VFR BLD AUTO: 44.1 % (ref 40–54)
HGB BLD-MCNC: 13.6 G/DL (ref 14–18)
HGB UR QL STRIP: NEGATIVE
IMM GRANULOCYTES # BLD AUTO: 0.06 K/UL (ref 0–0.04)
IMM GRANULOCYTES NFR BLD AUTO: 0.4 % (ref 0–0.5)
KETONES UR QL STRIP: ABNORMAL
LACTATE SERPL-SCNC: 1.2 MMOL/L (ref 0.5–2.2)
LACTATE SERPL-SCNC: 2.2 MMOL/L (ref 0.5–2.2)
LDH SERPL L TO P-CCNC: 168 U/L (ref 110–260)
LEUKOCYTE ESTERASE UR QL STRIP: ABNORMAL
LYMPHOCYTES # BLD AUTO: 1.4 K/UL (ref 1–4.8)
LYMPHOCYTES NFR BLD: 9.7 % (ref 18–48)
MCH RBC QN AUTO: 27.8 PG (ref 27–31)
MCHC RBC AUTO-ENTMCNC: 30.8 G/DL (ref 32–36)
MCV RBC AUTO: 90 FL (ref 82–98)
MICROSCOPIC COMMENT: NORMAL
MONOCYTES # BLD AUTO: 0.9 K/UL (ref 0.3–1)
MONOCYTES NFR BLD: 6.3 % (ref 4–15)
NEUTROPHILS # BLD AUTO: 12 K/UL (ref 1.8–7.7)
NEUTROPHILS NFR BLD: 81.8 % (ref 38–73)
NITRITE UR QL STRIP: NEGATIVE
NRBC BLD-RTO: 0 /100 WBC
NT-PROBNP SERPL-MCNC: 46 PG/ML (ref 5–450)
PH UR STRIP: 7 [PH] (ref 5–8)
PLATELET # BLD AUTO: 221 K/UL (ref 150–450)
PMV BLD AUTO: 9.8 FL (ref 9.2–12.9)
POTASSIUM SERPL-SCNC: 4.2 MMOL/L (ref 3.5–5.1)
PROT SERPL-MCNC: 7.2 G/DL (ref 6–8.4)
PROT UR QL STRIP: NEGATIVE
RBC # BLD AUTO: 4.9 M/UL (ref 4.6–6.2)
RBC #/AREA URNS AUTO: 2 /HPF (ref 0–4)
SARS-COV-2 RDRP RESP QL NAA+PROBE: NEGATIVE
SODIUM SERPL-SCNC: 138 MMOL/L (ref 136–145)
SP GR UR STRIP: 1.01 (ref 1–1.03)
SQUAMOUS #/AREA URNS AUTO: NORMAL /HPF
TROPONIN I SERPL-MCNC: <0.012 NG/ML (ref 0.01–0.03)
URN SPEC COLLECT METH UR: ABNORMAL
UROBILINOGEN UR STRIP-ACNC: ABNORMAL EU/DL
UUN UR-MCNC: 15 MG/DL (ref 2–20)
WBC # BLD AUTO: 14.7 K/UL (ref 3.9–12.7)
WBC #/AREA URNS AUTO: 3 /HPF (ref 0–5)

## 2021-05-12 PROCEDURE — 25000003 PHARM REV CODE 250: Performed by: STUDENT IN AN ORGANIZED HEALTH CARE EDUCATION/TRAINING PROGRAM

## 2021-05-12 PROCEDURE — 63600175 PHARM REV CODE 636 W HCPCS: Mod: ER | Performed by: EMERGENCY MEDICINE

## 2021-05-12 PROCEDURE — 25000003 PHARM REV CODE 250: Mod: ER | Performed by: EMERGENCY MEDICINE

## 2021-05-12 PROCEDURE — 85025 COMPLETE CBC W/AUTO DIFF WBC: CPT | Mod: ER | Performed by: EMERGENCY MEDICINE

## 2021-05-12 PROCEDURE — 94640 AIRWAY INHALATION TREATMENT: CPT | Mod: ER

## 2021-05-12 PROCEDURE — 83880 ASSAY OF NATRIURETIC PEPTIDE: CPT | Mod: ER | Performed by: EMERGENCY MEDICINE

## 2021-05-12 PROCEDURE — 81003 URINALYSIS AUTO W/O SCOPE: CPT | Mod: ER | Performed by: EMERGENCY MEDICINE

## 2021-05-12 PROCEDURE — 96375 TX/PRO/DX INJ NEW DRUG ADDON: CPT | Mod: ER

## 2021-05-12 PROCEDURE — 27000221 HC OXYGEN, UP TO 24 HOURS: Mod: ER

## 2021-05-12 PROCEDURE — 82728 ASSAY OF FERRITIN: CPT | Performed by: EMERGENCY MEDICINE

## 2021-05-12 PROCEDURE — 93005 ELECTROCARDIOGRAM TRACING: CPT | Mod: ER

## 2021-05-12 PROCEDURE — 87040 BLOOD CULTURE FOR BACTERIA: CPT | Mod: ER | Performed by: EMERGENCY MEDICINE

## 2021-05-12 PROCEDURE — 11000001 HC ACUTE MED/SURG PRIVATE ROOM

## 2021-05-12 PROCEDURE — 80053 COMPREHEN METABOLIC PANEL: CPT | Mod: ER | Performed by: EMERGENCY MEDICINE

## 2021-05-12 PROCEDURE — 83880 ASSAY OF NATRIURETIC PEPTIDE: CPT | Mod: 91 | Performed by: STUDENT IN AN ORGANIZED HEALTH CARE EDUCATION/TRAINING PROGRAM

## 2021-05-12 PROCEDURE — 93010 EKG 12-LEAD: ICD-10-PCS | Mod: ,,, | Performed by: INTERNAL MEDICINE

## 2021-05-12 PROCEDURE — 83615 LACTATE (LD) (LDH) ENZYME: CPT | Performed by: EMERGENCY MEDICINE

## 2021-05-12 PROCEDURE — 84145 PROCALCITONIN (PCT): CPT | Mod: ER | Performed by: EMERGENCY MEDICINE

## 2021-05-12 PROCEDURE — 81000 URINALYSIS NONAUTO W/SCOPE: CPT | Mod: ER | Performed by: EMERGENCY MEDICINE

## 2021-05-12 PROCEDURE — 82550 ASSAY OF CK (CPK): CPT | Mod: ER | Performed by: EMERGENCY MEDICINE

## 2021-05-12 PROCEDURE — 99285 EMERGENCY DEPT VISIT HI MDM: CPT | Mod: 25,ER

## 2021-05-12 PROCEDURE — U0002 COVID-19 LAB TEST NON-CDC: HCPCS | Mod: ER | Performed by: EMERGENCY MEDICINE

## 2021-05-12 PROCEDURE — 36415 COLL VENOUS BLD VENIPUNCTURE: CPT | Performed by: STUDENT IN AN ORGANIZED HEALTH CARE EDUCATION/TRAINING PROGRAM

## 2021-05-12 PROCEDURE — 25000242 PHARM REV CODE 250 ALT 637 W/ HCPCS: Mod: ER | Performed by: EMERGENCY MEDICINE

## 2021-05-12 PROCEDURE — 94760 N-INVAS EAR/PLS OXIMETRY 1: CPT | Mod: ER

## 2021-05-12 PROCEDURE — 96365 THER/PROPH/DIAG IV INF INIT: CPT | Mod: ER

## 2021-05-12 PROCEDURE — 86140 C-REACTIVE PROTEIN: CPT | Mod: ER | Performed by: EMERGENCY MEDICINE

## 2021-05-12 PROCEDURE — 84484 ASSAY OF TROPONIN QUANT: CPT | Mod: ER | Performed by: EMERGENCY MEDICINE

## 2021-05-12 PROCEDURE — 83605 ASSAY OF LACTIC ACID: CPT | Mod: ER | Performed by: EMERGENCY MEDICINE

## 2021-05-12 PROCEDURE — 93010 ELECTROCARDIOGRAM REPORT: CPT | Mod: ,,, | Performed by: INTERNAL MEDICINE

## 2021-05-12 PROCEDURE — 96366 THER/PROPH/DIAG IV INF ADDON: CPT | Mod: ER

## 2021-05-12 PROCEDURE — 83605 ASSAY OF LACTIC ACID: CPT | Mod: 91 | Performed by: EMERGENCY MEDICINE

## 2021-05-12 RX ORDER — MELOXICAM 7.5 MG/1
7.5 TABLET ORAL DAILY
Status: DISCONTINUED | OUTPATIENT
Start: 2021-05-12 | End: 2021-05-13

## 2021-05-12 RX ORDER — ESCITALOPRAM OXALATE 10 MG/1
10 TABLET ORAL DAILY
Status: DISCONTINUED | OUTPATIENT
Start: 2021-05-13 | End: 2021-05-15 | Stop reason: HOSPADM

## 2021-05-12 RX ORDER — IBUPROFEN 200 MG
1 TABLET ORAL DAILY
Status: DISCONTINUED | OUTPATIENT
Start: 2021-05-13 | End: 2021-05-15 | Stop reason: HOSPADM

## 2021-05-12 RX ORDER — ESCITALOPRAM OXALATE 10 MG/1
10 TABLET ORAL DAILY
COMMUNITY
End: 2021-06-15 | Stop reason: ALTCHOICE

## 2021-05-12 RX ORDER — GABAPENTIN 800 MG/1
800 TABLET ORAL 3 TIMES DAILY
COMMUNITY
End: 2022-02-15 | Stop reason: SDUPTHER

## 2021-05-12 RX ORDER — IPRATROPIUM BROMIDE AND ALBUTEROL SULFATE 2.5; .5 MG/3ML; MG/3ML
3 SOLUTION RESPIRATORY (INHALATION) EVERY 6 HOURS PRN
Status: DISCONTINUED | OUTPATIENT
Start: 2021-05-13 | End: 2021-05-15 | Stop reason: HOSPADM

## 2021-05-12 RX ORDER — OXYCODONE AND ACETAMINOPHEN 7.5; 325 MG/1; MG/1
1 TABLET ORAL EVERY 6 HOURS
Status: DISCONTINUED | OUTPATIENT
Start: 2021-05-12 | End: 2021-05-15 | Stop reason: HOSPADM

## 2021-05-12 RX ORDER — OXYCODONE AND ACETAMINOPHEN 7.5; 325 MG/1; MG/1
1 TABLET ORAL EVERY 4 HOURS PRN
COMMUNITY
End: 2022-02-15

## 2021-05-12 RX ORDER — IPRATROPIUM BROMIDE AND ALBUTEROL SULFATE 2.5; .5 MG/3ML; MG/3ML
3 SOLUTION RESPIRATORY (INHALATION)
Status: DISCONTINUED | OUTPATIENT
Start: 2021-05-13 | End: 2021-05-15 | Stop reason: HOSPADM

## 2021-05-12 RX ORDER — MAGNESIUM SULFATE HEPTAHYDRATE 40 MG/ML
2 INJECTION, SOLUTION INTRAVENOUS
Status: COMPLETED | OUTPATIENT
Start: 2021-05-12 | End: 2021-05-12

## 2021-05-12 RX ORDER — PREDNISONE 20 MG/1
40 TABLET ORAL
Status: COMPLETED | OUTPATIENT
Start: 2021-05-12 | End: 2021-05-12

## 2021-05-12 RX ORDER — MORPHINE SULFATE 4 MG/ML
4 INJECTION, SOLUTION INTRAMUSCULAR; INTRAVENOUS
Status: COMPLETED | OUTPATIENT
Start: 2021-05-12 | End: 2021-05-12

## 2021-05-12 RX ORDER — LEVOFLOXACIN 5 MG/ML
750 INJECTION, SOLUTION INTRAVENOUS ONCE
Status: COMPLETED | OUTPATIENT
Start: 2021-05-12 | End: 2021-05-12

## 2021-05-12 RX ORDER — ALBUTEROL SULFATE 2.5 MG/.5ML
5 SOLUTION RESPIRATORY (INHALATION)
Status: COMPLETED | OUTPATIENT
Start: 2021-05-12 | End: 2021-05-12

## 2021-05-12 RX ORDER — TIZANIDINE 4 MG/1
4 TABLET ORAL 3 TIMES DAILY
Status: DISCONTINUED | OUTPATIENT
Start: 2021-05-12 | End: 2021-05-15 | Stop reason: HOSPADM

## 2021-05-12 RX ORDER — GABAPENTIN 400 MG/1
800 CAPSULE ORAL 3 TIMES DAILY
Status: DISCONTINUED | OUTPATIENT
Start: 2021-05-13 | End: 2021-05-15 | Stop reason: HOSPADM

## 2021-05-12 RX ORDER — IPRATROPIUM BROMIDE AND ALBUTEROL SULFATE 2.5; .5 MG/3ML; MG/3ML
3 SOLUTION RESPIRATORY (INHALATION)
Status: COMPLETED | OUTPATIENT
Start: 2021-05-12 | End: 2021-05-12

## 2021-05-12 RX ORDER — MELOXICAM 7.5 MG/1
7.5 TABLET ORAL DAILY
COMMUNITY
End: 2022-02-15

## 2021-05-12 RX ORDER — LEVOFLOXACIN 5 MG/ML
750 INJECTION, SOLUTION INTRAVENOUS
Status: DISCONTINUED | OUTPATIENT
Start: 2021-05-13 | End: 2021-05-14

## 2021-05-12 RX ORDER — PREDNISONE 20 MG/1
40 TABLET ORAL DAILY
Status: DISCONTINUED | OUTPATIENT
Start: 2021-05-13 | End: 2021-05-15 | Stop reason: HOSPADM

## 2021-05-12 RX ORDER — TIZANIDINE 4 MG/1
4 TABLET ORAL 3 TIMES DAILY PRN
COMMUNITY
Start: 2021-04-26 | End: 2022-02-15

## 2021-05-12 RX ADMIN — AZITHROMYCIN MONOHYDRATE 500 MG: 500 INJECTION, POWDER, LYOPHILIZED, FOR SOLUTION INTRAVENOUS at 05:05

## 2021-05-12 RX ADMIN — MORPHINE SULFATE 4 MG: 4 INJECTION INTRAVENOUS at 07:05

## 2021-05-12 RX ADMIN — MELOXICAM 7.5 MG: 7.5 TABLET ORAL at 11:05

## 2021-05-12 RX ADMIN — ALBUTEROL SULFATE 5 MG: 2.5 SOLUTION RESPIRATORY (INHALATION) at 07:05

## 2021-05-12 RX ADMIN — LEVOFLOXACIN 750 MG: 750 INJECTION, SOLUTION INTRAVENOUS at 08:05

## 2021-05-12 RX ADMIN — MAGNESIUM SULFATE IN WATER 2 G: 40 INJECTION, SOLUTION INTRAVENOUS at 07:05

## 2021-05-12 RX ADMIN — IPRATROPIUM BROMIDE AND ALBUTEROL SULFATE 3 ML: .5; 3 SOLUTION RESPIRATORY (INHALATION) at 05:05

## 2021-05-12 RX ADMIN — TIZANIDINE 4 MG: 4 TABLET ORAL at 11:05

## 2021-05-12 RX ADMIN — OXYCODONE AND ACETAMINOPHEN 1 TABLET: 7.5; 325 TABLET ORAL at 11:05

## 2021-05-12 RX ADMIN — SODIUM CHLORIDE 1000 ML: 0.9 INJECTION, SOLUTION INTRAVENOUS at 06:05

## 2021-05-12 RX ADMIN — PREDNISONE 40 MG: 20 TABLET ORAL at 05:05

## 2021-05-12 RX ADMIN — SODIUM CHLORIDE 500 ML: 0.9 INJECTION, SOLUTION INTRAVENOUS at 05:05

## 2021-05-13 ENCOUNTER — CLINICAL SUPPORT (OUTPATIENT)
Dept: SMOKING CESSATION | Facility: CLINIC | Age: 51
End: 2021-05-13
Payer: COMMERCIAL

## 2021-05-13 DIAGNOSIS — F17.210 CIGARETTE SMOKER: Primary | ICD-10-CM

## 2021-05-13 LAB
ALBUMIN SERPL BCP-MCNC: 3.4 G/DL (ref 3.5–5.2)
ALP SERPL-CCNC: 109 U/L (ref 55–135)
ALT SERPL W/O P-5'-P-CCNC: 27 U/L (ref 10–44)
ANION GAP SERPL CALC-SCNC: 7 MMOL/L (ref 8–16)
AORTIC ROOT ANNULUS: 3.42 CM
AORTIC VALVE CUSP SEPERATION: 2.56 CM
AST SERPL-CCNC: 16 U/L (ref 10–40)
AV INDEX (PROSTH): 0.66
AV MEAN GRADIENT: 6 MMHG
AV PEAK GRADIENT: 11 MMHG
AV VALVE AREA: 2.18 CM2
AV VELOCITY RATIO: 0.73
BASOPHILS # BLD AUTO: 0.05 K/UL (ref 0–0.2)
BASOPHILS NFR BLD: 0.3 % (ref 0–1.9)
BILIRUB SERPL-MCNC: 0.9 MG/DL (ref 0.1–1)
BSA FOR ECHO PROCEDURE: 3.19 M2
BUN SERPL-MCNC: 14 MG/DL (ref 6–20)
CALCIUM SERPL-MCNC: 8.7 MG/DL (ref 8.7–10.5)
CHLORIDE SERPL-SCNC: 101 MMOL/L (ref 95–110)
CHOLEST SERPL-MCNC: 144 MG/DL (ref 120–199)
CHOLEST/HDLC SERPL: 3.7 {RATIO} (ref 2–5)
CO2 SERPL-SCNC: 30 MMOL/L (ref 23–29)
CREAT SERPL-MCNC: 0.7 MG/DL (ref 0.5–1.4)
CV ECHO LV RWT: 0.37 CM
DIFFERENTIAL METHOD: ABNORMAL
DOP CALC AO PEAK VEL: 1.68 M/S
DOP CALC AO VTI: 31.15 CM
DOP CALC LVOT AREA: 3.3 CM2
DOP CALC LVOT DIAMETER: 2.05 CM
DOP CALC LVOT PEAK VEL: 1.23 M/S
DOP CALC LVOT STROKE VOLUME: 68.06 CM3
DOP CALCLVOT PEAK VEL VTI: 20.63 CM
E WAVE DECELERATION TIME: 261.61 MSEC
E/A RATIO: 1.67
E/E' RATIO: 6.07 M/S
ECHO LV POSTERIOR WALL: 1.07 CM (ref 0.6–1.1)
EJECTION FRACTION: 55 %
EOSINOPHIL # BLD AUTO: 0.2 K/UL (ref 0–0.5)
EOSINOPHIL NFR BLD: 1 % (ref 0–8)
ERYTHROCYTE [DISTWIDTH] IN BLOOD BY AUTOMATED COUNT: 17.2 % (ref 11.5–14.5)
EST. GFR  (AFRICAN AMERICAN): >60 ML/MIN/1.73 M^2
EST. GFR  (NON AFRICAN AMERICAN): >60 ML/MIN/1.73 M^2
ESTIMATED AVG GLUCOSE: 128 MG/DL (ref 68–131)
FRACTIONAL SHORTENING: 34 % (ref 28–44)
GLUCOSE SERPL-MCNC: 104 MG/DL (ref 70–110)
HBA1C MFR BLD: 6.1 % (ref 4–5.6)
HCT VFR BLD AUTO: 44.1 % (ref 40–54)
HDLC SERPL-MCNC: 39 MG/DL (ref 40–75)
HDLC SERPL: 27.1 % (ref 20–50)
HGB BLD-MCNC: 13.7 G/DL (ref 14–18)
IMM GRANULOCYTES # BLD AUTO: 0.1 K/UL (ref 0–0.04)
IMM GRANULOCYTES NFR BLD AUTO: 0.6 % (ref 0–0.5)
INTERVENTRICULAR SEPTUM: 1.09 CM (ref 0.6–1.1)
IVRT: 78.02 MSEC
LA MAJOR: 6.62 CM
LA MINOR: 6.08 CM
LA WIDTH: 5.16 CM
LDLC SERPL CALC-MCNC: 90.4 MG/DL (ref 63–159)
LEFT ATRIUM SIZE: 4.04 CM
LEFT ATRIUM VOLUME INDEX MOD: 18.3 ML/M2
LEFT ATRIUM VOLUME INDEX: 37.3 ML/M2
LEFT ATRIUM VOLUME MOD: 55.03 CM3
LEFT ATRIUM VOLUME: 112.32 CM3
LEFT INTERNAL DIMENSION IN SYSTOLE: 3.8 CM (ref 2.1–4)
LEFT VENTRICLE DIASTOLIC VOLUME INDEX: 55.1 ML/M2
LEFT VENTRICLE DIASTOLIC VOLUME: 165.85 ML
LEFT VENTRICLE MASS INDEX: 85 G/M2
LEFT VENTRICLE SYSTOLIC VOLUME INDEX: 20.6 ML/M2
LEFT VENTRICLE SYSTOLIC VOLUME: 62.06 ML
LEFT VENTRICULAR INTERNAL DIMENSION IN DIASTOLE: 5.79 CM (ref 3.5–6)
LEFT VENTRICULAR MASS: 257.15 G
LV LATERAL E/E' RATIO: 5.67 M/S
LV SEPTAL E/E' RATIO: 6.54 M/S
LYMPHOCYTES # BLD AUTO: 1.9 K/UL (ref 1–4.8)
LYMPHOCYTES NFR BLD: 11.2 % (ref 18–48)
MCH RBC QN AUTO: 27.7 PG (ref 27–31)
MCHC RBC AUTO-ENTMCNC: 31.1 G/DL (ref 32–36)
MCV RBC AUTO: 89 FL (ref 82–98)
MONOCYTES # BLD AUTO: 1.1 K/UL (ref 0.3–1)
MONOCYTES NFR BLD: 6.5 % (ref 4–15)
MV A" WAVE DURATION": 13.7 MSEC
MV MEAN GRADIENT: 1 MMHG
MV PEAK A VEL: 0.51 M/S
MV PEAK E VEL: 0.85 M/S
MV PEAK GRADIENT: 5 MMHG
MV STENOSIS PRESSURE HALF TIME: 75.87 MS
MV VALVE AREA P 1/2 METHOD: 2.9 CM2
NEUTROPHILS # BLD AUTO: 13.6 K/UL (ref 1.8–7.7)
NEUTROPHILS NFR BLD: 80.4 % (ref 38–73)
NONHDLC SERPL-MCNC: 105 MG/DL
NRBC BLD-RTO: 0 /100 WBC
PISA TR MAX VEL: 1.73 M/S
PLATELET # BLD AUTO: 240 K/UL (ref 150–450)
PMV BLD AUTO: 9.6 FL (ref 9.2–12.9)
POTASSIUM SERPL-SCNC: 4.3 MMOL/L (ref 3.5–5.1)
PROCALCITONIN SERPL IA-MCNC: 0.05 NG/ML
PROT SERPL-MCNC: 7.5 G/DL (ref 6–8.4)
PULM VEIN S/D RATIO: 1.16
PV PEAK D VEL: 0.38 M/S
PV PEAK S VEL: 0.44 M/S
PV PEAK VELOCITY: 1.21 CM/S
RBC # BLD AUTO: 4.94 M/UL (ref 4.6–6.2)
RIGHT VENTRICULAR END-DIASTOLIC DIMENSION: 2.83 CM
SODIUM SERPL-SCNC: 138 MMOL/L (ref 136–145)
TDI LATERAL: 0.15 M/S
TDI SEPTAL: 0.13 M/S
TDI: 0.14 M/S
TR MAX PG: 12 MMHG
TRIGL SERPL-MCNC: 73 MG/DL (ref 30–150)
WBC # BLD AUTO: 16.95 K/UL (ref 3.9–12.7)

## 2021-05-13 PROCEDURE — 11000001 HC ACUTE MED/SURG PRIVATE ROOM

## 2021-05-13 PROCEDURE — 25000003 PHARM REV CODE 250: Performed by: STUDENT IN AN ORGANIZED HEALTH CARE EDUCATION/TRAINING PROGRAM

## 2021-05-13 PROCEDURE — 83036 HEMOGLOBIN GLYCOSYLATED A1C: CPT | Performed by: STUDENT IN AN ORGANIZED HEALTH CARE EDUCATION/TRAINING PROGRAM

## 2021-05-13 PROCEDURE — 80061 LIPID PANEL: CPT | Performed by: STUDENT IN AN ORGANIZED HEALTH CARE EDUCATION/TRAINING PROGRAM

## 2021-05-13 PROCEDURE — 25000242 PHARM REV CODE 250 ALT 637 W/ HCPCS: Performed by: STUDENT IN AN ORGANIZED HEALTH CARE EDUCATION/TRAINING PROGRAM

## 2021-05-13 PROCEDURE — 86480 TB TEST CELL IMMUN MEASURE: CPT | Performed by: STUDENT IN AN ORGANIZED HEALTH CARE EDUCATION/TRAINING PROGRAM

## 2021-05-13 PROCEDURE — 80053 COMPREHEN METABOLIC PANEL: CPT | Performed by: STUDENT IN AN ORGANIZED HEALTH CARE EDUCATION/TRAINING PROGRAM

## 2021-05-13 PROCEDURE — 99999 PR PBB SHADOW E&M-EST. PATIENT-LVL I: CPT | Mod: PBBFAC,,,

## 2021-05-13 PROCEDURE — 99407 PR TOBACCO USE CESSATION INTENSIVE >10 MINUTES: ICD-10-PCS | Mod: S$GLB,,,

## 2021-05-13 PROCEDURE — 99999 PR PBB SHADOW E&M-EST. PATIENT-LVL I: ICD-10-PCS | Mod: PBBFAC,,,

## 2021-05-13 PROCEDURE — 99407 BEHAV CHNG SMOKING > 10 MIN: CPT | Mod: S$GLB,,,

## 2021-05-13 PROCEDURE — 87651 STREP A DNA AMP PROBE: CPT | Performed by: STUDENT IN AN ORGANIZED HEALTH CARE EDUCATION/TRAINING PROGRAM

## 2021-05-13 PROCEDURE — 36415 COLL VENOUS BLD VENIPUNCTURE: CPT | Performed by: STUDENT IN AN ORGANIZED HEALTH CARE EDUCATION/TRAINING PROGRAM

## 2021-05-13 PROCEDURE — 27000221 HC OXYGEN, UP TO 24 HOURS

## 2021-05-13 PROCEDURE — 94640 AIRWAY INHALATION TREATMENT: CPT

## 2021-05-13 PROCEDURE — 63600175 PHARM REV CODE 636 W HCPCS: Performed by: STUDENT IN AN ORGANIZED HEALTH CARE EDUCATION/TRAINING PROGRAM

## 2021-05-13 PROCEDURE — 87632 RESP VIRUS 6-11 TARGETS: CPT | Performed by: STUDENT IN AN ORGANIZED HEALTH CARE EDUCATION/TRAINING PROGRAM

## 2021-05-13 PROCEDURE — 99900035 HC TECH TIME PER 15 MIN (STAT)

## 2021-05-13 PROCEDURE — 85025 COMPLETE CBC W/AUTO DIFF WBC: CPT | Performed by: STUDENT IN AN ORGANIZED HEALTH CARE EDUCATION/TRAINING PROGRAM

## 2021-05-13 PROCEDURE — S4991 NICOTINE PATCH NONLEGEND: HCPCS | Performed by: STUDENT IN AN ORGANIZED HEALTH CARE EDUCATION/TRAINING PROGRAM

## 2021-05-13 RX ORDER — LIDOCAINE 50 MG/G
2 PATCH TOPICAL
Status: DISCONTINUED | OUTPATIENT
Start: 2021-05-13 | End: 2021-05-15 | Stop reason: HOSPADM

## 2021-05-13 RX ORDER — ALBUTEROL SULFATE 2.5 MG/.5ML
2.5 SOLUTION RESPIRATORY (INHALATION)
Status: DISCONTINUED | OUTPATIENT
Start: 2021-05-13 | End: 2021-05-15 | Stop reason: HOSPADM

## 2021-05-13 RX ORDER — SODIUM CHLORIDE FOR INHALATION 3 %
4 VIAL, NEBULIZER (ML) INHALATION
Status: DISCONTINUED | OUTPATIENT
Start: 2021-05-13 | End: 2021-05-15 | Stop reason: HOSPADM

## 2021-05-13 RX ORDER — ENOXAPARIN SODIUM 100 MG/ML
40 INJECTION SUBCUTANEOUS EVERY 24 HOURS
Status: DISCONTINUED | OUTPATIENT
Start: 2021-05-13 | End: 2021-05-15 | Stop reason: HOSPADM

## 2021-05-13 RX ADMIN — LEVOFLOXACIN 750 MG: 750 INJECTION, SOLUTION INTRAVENOUS at 09:05

## 2021-05-13 RX ADMIN — IPRATROPIUM BROMIDE AND ALBUTEROL SULFATE 3 ML: .5; 2.5 SOLUTION RESPIRATORY (INHALATION) at 03:05

## 2021-05-13 RX ADMIN — IPRATROPIUM BROMIDE AND ALBUTEROL SULFATE 3 ML: .5; 2.5 SOLUTION RESPIRATORY (INHALATION) at 11:05

## 2021-05-13 RX ADMIN — MELOXICAM 7.5 MG: 7.5 TABLET ORAL at 08:05

## 2021-05-13 RX ADMIN — GABAPENTIN 800 MG: 400 CAPSULE ORAL at 08:05

## 2021-05-13 RX ADMIN — GABAPENTIN 800 MG: 400 CAPSULE ORAL at 04:05

## 2021-05-13 RX ADMIN — TIZANIDINE 4 MG: 4 TABLET ORAL at 04:05

## 2021-05-13 RX ADMIN — Medication 1 PATCH: at 08:05

## 2021-05-13 RX ADMIN — GABAPENTIN 800 MG: 400 CAPSULE ORAL at 09:05

## 2021-05-13 RX ADMIN — OXYCODONE AND ACETAMINOPHEN 1 TABLET: 7.5; 325 TABLET ORAL at 05:05

## 2021-05-13 RX ADMIN — LIDOCAINE 2 PATCH: 50 PATCH TOPICAL at 12:05

## 2021-05-13 RX ADMIN — OXYCODONE AND ACETAMINOPHEN 1 TABLET: 7.5; 325 TABLET ORAL at 12:05

## 2021-05-13 RX ADMIN — ESCITALOPRAM OXALATE 10 MG: 10 TABLET ORAL at 08:05

## 2021-05-13 RX ADMIN — IPRATROPIUM BROMIDE AND ALBUTEROL SULFATE 3 ML: .5; 2.5 SOLUTION RESPIRATORY (INHALATION) at 08:05

## 2021-05-13 RX ADMIN — TIZANIDINE 4 MG: 4 TABLET ORAL at 08:05

## 2021-05-13 RX ADMIN — TIZANIDINE 4 MG: 4 TABLET ORAL at 09:05

## 2021-05-13 RX ADMIN — IPRATROPIUM BROMIDE AND ALBUTEROL SULFATE 3 ML: .5; 2.5 SOLUTION RESPIRATORY (INHALATION) at 07:05

## 2021-05-13 RX ADMIN — ENOXAPARIN SODIUM 40 MG: 40 INJECTION SUBCUTANEOUS at 04:05

## 2021-05-13 RX ADMIN — PREDNISONE 40 MG: 20 TABLET ORAL at 04:05

## 2021-05-14 ENCOUNTER — CLINICAL SUPPORT (OUTPATIENT)
Dept: SMOKING CESSATION | Facility: CLINIC | Age: 51
End: 2021-05-14
Payer: COMMERCIAL

## 2021-05-14 DIAGNOSIS — F17.210 CIGARETTE SMOKER: Primary | ICD-10-CM

## 2021-05-14 PROBLEM — R15.9 FECAL INCONTINENCE: Chronic | Status: ACTIVE | Noted: 2021-05-14

## 2021-05-14 PROBLEM — R32 URINARY INCONTINENCE: Chronic | Status: ACTIVE | Noted: 2021-05-14

## 2021-05-14 LAB
ALBUMIN SERPL BCP-MCNC: 3.1 G/DL (ref 3.5–5.2)
ALP SERPL-CCNC: 97 U/L (ref 55–135)
ALT SERPL W/O P-5'-P-CCNC: 25 U/L (ref 10–44)
ANION GAP SERPL CALC-SCNC: 8 MMOL/L (ref 8–16)
AST SERPL-CCNC: 13 U/L (ref 10–40)
BASOPHILS # BLD AUTO: 0.04 K/UL (ref 0–0.2)
BASOPHILS NFR BLD: 0.3 % (ref 0–1.9)
BILIRUB SERPL-MCNC: 0.7 MG/DL (ref 0.1–1)
BUN SERPL-MCNC: 13 MG/DL (ref 6–20)
CALCIUM SERPL-MCNC: 8.6 MG/DL (ref 8.7–10.5)
CHLORIDE SERPL-SCNC: 103 MMOL/L (ref 95–110)
CO2 SERPL-SCNC: 29 MMOL/L (ref 23–29)
CREAT SERPL-MCNC: 0.7 MG/DL (ref 0.5–1.4)
DIFFERENTIAL METHOD: ABNORMAL
EOSINOPHIL # BLD AUTO: 0.2 K/UL (ref 0–0.5)
EOSINOPHIL NFR BLD: 1.5 % (ref 0–8)
ERYTHROCYTE [DISTWIDTH] IN BLOOD BY AUTOMATED COUNT: 17.1 % (ref 11.5–14.5)
EST. GFR  (AFRICAN AMERICAN): >60 ML/MIN/1.73 M^2
EST. GFR  (NON AFRICAN AMERICAN): >60 ML/MIN/1.73 M^2
GLUCOSE SERPL-MCNC: 101 MG/DL (ref 70–110)
HCT VFR BLD AUTO: 41.3 % (ref 40–54)
HGB BLD-MCNC: 12.6 G/DL (ref 14–18)
IMM GRANULOCYTES # BLD AUTO: 0.07 K/UL (ref 0–0.04)
IMM GRANULOCYTES NFR BLD AUTO: 0.5 % (ref 0–0.5)
LYMPHOCYTES # BLD AUTO: 1.4 K/UL (ref 1–4.8)
LYMPHOCYTES NFR BLD: 11.1 % (ref 18–48)
MCH RBC QN AUTO: 27.8 PG (ref 27–31)
MCHC RBC AUTO-ENTMCNC: 30.5 G/DL (ref 32–36)
MCV RBC AUTO: 91 FL (ref 82–98)
MONOCYTES # BLD AUTO: 0.7 K/UL (ref 0.3–1)
MONOCYTES NFR BLD: 5.8 % (ref 4–15)
NEUTROPHILS # BLD AUTO: 10.4 K/UL (ref 1.8–7.7)
NEUTROPHILS NFR BLD: 80.8 % (ref 38–73)
NRBC BLD-RTO: 0 /100 WBC
PLATELET # BLD AUTO: 204 K/UL (ref 150–450)
PMV BLD AUTO: 9.6 FL (ref 9.2–12.9)
POTASSIUM SERPL-SCNC: 4.2 MMOL/L (ref 3.5–5.1)
PROT SERPL-MCNC: 7.1 G/DL (ref 6–8.4)
RBC # BLD AUTO: 4.54 M/UL (ref 4.6–6.2)
SODIUM SERPL-SCNC: 140 MMOL/L (ref 136–145)
WBC # BLD AUTO: 12.83 K/UL (ref 3.9–12.7)

## 2021-05-14 PROCEDURE — 99407 PR TOBACCO USE CESSATION INTENSIVE >10 MINUTES: ICD-10-PCS | Mod: S$GLB,,,

## 2021-05-14 PROCEDURE — 87798 DETECT AGENT NOS DNA AMP: CPT | Performed by: STUDENT IN AN ORGANIZED HEALTH CARE EDUCATION/TRAINING PROGRAM

## 2021-05-14 PROCEDURE — 94640 AIRWAY INHALATION TREATMENT: CPT

## 2021-05-14 PROCEDURE — 99900035 HC TECH TIME PER 15 MIN (STAT)

## 2021-05-14 PROCEDURE — 85025 COMPLETE CBC W/AUTO DIFF WBC: CPT | Performed by: STUDENT IN AN ORGANIZED HEALTH CARE EDUCATION/TRAINING PROGRAM

## 2021-05-14 PROCEDURE — S4991 NICOTINE PATCH NONLEGEND: HCPCS | Performed by: STUDENT IN AN ORGANIZED HEALTH CARE EDUCATION/TRAINING PROGRAM

## 2021-05-14 PROCEDURE — 36415 COLL VENOUS BLD VENIPUNCTURE: CPT | Performed by: STUDENT IN AN ORGANIZED HEALTH CARE EDUCATION/TRAINING PROGRAM

## 2021-05-14 PROCEDURE — 99407 BEHAV CHNG SMOKING > 10 MIN: CPT | Mod: S$GLB,,,

## 2021-05-14 PROCEDURE — 25500020 PHARM REV CODE 255: Performed by: INTERNAL MEDICINE

## 2021-05-14 PROCEDURE — 87070 CULTURE OTHR SPECIMN AEROBIC: CPT | Performed by: STUDENT IN AN ORGANIZED HEALTH CARE EDUCATION/TRAINING PROGRAM

## 2021-05-14 PROCEDURE — A9585 GADOBUTROL INJECTION: HCPCS | Performed by: INTERNAL MEDICINE

## 2021-05-14 PROCEDURE — 87581 M.PNEUMON DNA AMP PROBE: CPT | Performed by: STUDENT IN AN ORGANIZED HEALTH CARE EDUCATION/TRAINING PROGRAM

## 2021-05-14 PROCEDURE — 83540 ASSAY OF IRON: CPT | Performed by: STUDENT IN AN ORGANIZED HEALTH CARE EDUCATION/TRAINING PROGRAM

## 2021-05-14 PROCEDURE — 11000001 HC ACUTE MED/SURG PRIVATE ROOM

## 2021-05-14 PROCEDURE — 82607 VITAMIN B-12: CPT | Performed by: STUDENT IN AN ORGANIZED HEALTH CARE EDUCATION/TRAINING PROGRAM

## 2021-05-14 PROCEDURE — 27000221 HC OXYGEN, UP TO 24 HOURS

## 2021-05-14 PROCEDURE — 80053 COMPREHEN METABOLIC PANEL: CPT | Performed by: STUDENT IN AN ORGANIZED HEALTH CARE EDUCATION/TRAINING PROGRAM

## 2021-05-14 PROCEDURE — 87205 SMEAR GRAM STAIN: CPT | Performed by: STUDENT IN AN ORGANIZED HEALTH CARE EDUCATION/TRAINING PROGRAM

## 2021-05-14 PROCEDURE — 25000003 PHARM REV CODE 250: Performed by: STUDENT IN AN ORGANIZED HEALTH CARE EDUCATION/TRAINING PROGRAM

## 2021-05-14 PROCEDURE — 25000242 PHARM REV CODE 250 ALT 637 W/ HCPCS: Performed by: STUDENT IN AN ORGANIZED HEALTH CARE EDUCATION/TRAINING PROGRAM

## 2021-05-14 PROCEDURE — 82746 ASSAY OF FOLIC ACID SERUM: CPT | Performed by: STUDENT IN AN ORGANIZED HEALTH CARE EDUCATION/TRAINING PROGRAM

## 2021-05-14 PROCEDURE — 25000003 PHARM REV CODE 250: Performed by: INTERNAL MEDICINE

## 2021-05-14 PROCEDURE — 94761 N-INVAS EAR/PLS OXIMETRY MLT: CPT

## 2021-05-14 PROCEDURE — 63600175 PHARM REV CODE 636 W HCPCS: Performed by: STUDENT IN AN ORGANIZED HEALTH CARE EDUCATION/TRAINING PROGRAM

## 2021-05-14 RX ORDER — GADOBUTROL 604.72 MG/ML
10 INJECTION INTRAVENOUS
Status: COMPLETED | OUTPATIENT
Start: 2021-05-14 | End: 2021-05-14

## 2021-05-14 RX ORDER — LEVOFLOXACIN 750 MG/1
750 TABLET ORAL DAILY
Status: DISCONTINUED | OUTPATIENT
Start: 2021-05-14 | End: 2021-05-15 | Stop reason: HOSPADM

## 2021-05-14 RX ADMIN — TIZANIDINE 4 MG: 4 TABLET ORAL at 03:05

## 2021-05-14 RX ADMIN — OXYCODONE AND ACETAMINOPHEN 1 TABLET: 7.5; 325 TABLET ORAL at 11:05

## 2021-05-14 RX ADMIN — PREDNISONE 40 MG: 20 TABLET ORAL at 09:05

## 2021-05-14 RX ADMIN — IPRATROPIUM BROMIDE AND ALBUTEROL SULFATE 3 ML: .5; 2.5 SOLUTION RESPIRATORY (INHALATION) at 12:05

## 2021-05-14 RX ADMIN — TIZANIDINE 4 MG: 4 TABLET ORAL at 08:05

## 2021-05-14 RX ADMIN — OXYCODONE AND ACETAMINOPHEN 1 TABLET: 7.5; 325 TABLET ORAL at 05:05

## 2021-05-14 RX ADMIN — OXYCODONE AND ACETAMINOPHEN 1 TABLET: 7.5; 325 TABLET ORAL at 12:05

## 2021-05-14 RX ADMIN — LEVOFLOXACIN 750 MG: 750 TABLET, FILM COATED ORAL at 08:05

## 2021-05-14 RX ADMIN — GABAPENTIN 800 MG: 400 CAPSULE ORAL at 09:05

## 2021-05-14 RX ADMIN — ENOXAPARIN SODIUM 40 MG: 40 INJECTION SUBCUTANEOUS at 05:05

## 2021-05-14 RX ADMIN — LIDOCAINE 2 PATCH: 50 PATCH TOPICAL at 12:05

## 2021-05-14 RX ADMIN — IPRATROPIUM BROMIDE AND ALBUTEROL SULFATE 3 ML: .5; 2.5 SOLUTION RESPIRATORY (INHALATION) at 04:05

## 2021-05-14 RX ADMIN — IPRATROPIUM BROMIDE AND ALBUTEROL SULFATE 3 ML: .5; 2.5 SOLUTION RESPIRATORY (INHALATION) at 07:05

## 2021-05-14 RX ADMIN — GADOBUTROL 10 ML: 604.72 INJECTION INTRAVENOUS at 12:05

## 2021-05-14 RX ADMIN — TIZANIDINE 4 MG: 4 TABLET ORAL at 09:05

## 2021-05-14 RX ADMIN — IPRATROPIUM BROMIDE AND ALBUTEROL SULFATE 3 ML: .5; 2.5 SOLUTION RESPIRATORY (INHALATION) at 08:05

## 2021-05-14 RX ADMIN — GABAPENTIN 800 MG: 400 CAPSULE ORAL at 03:05

## 2021-05-14 RX ADMIN — ESCITALOPRAM OXALATE 10 MG: 10 TABLET ORAL at 09:05

## 2021-05-14 RX ADMIN — Medication 1 PATCH: at 09:05

## 2021-05-14 RX ADMIN — GABAPENTIN 800 MG: 400 CAPSULE ORAL at 08:05

## 2021-05-15 VITALS
DIASTOLIC BLOOD PRESSURE: 82 MMHG | TEMPERATURE: 99 F | HEIGHT: 74 IN | RESPIRATION RATE: 18 BRPM | OXYGEN SATURATION: 91 % | BODY MASS INDEX: 40.43 KG/M2 | WEIGHT: 315 LBS | HEART RATE: 72 BPM | SYSTOLIC BLOOD PRESSURE: 131 MMHG

## 2021-05-15 LAB
ALBUMIN SERPL BCP-MCNC: 3.2 G/DL (ref 3.5–5.2)
ALP SERPL-CCNC: 107 U/L (ref 55–135)
ALT SERPL W/O P-5'-P-CCNC: 19 U/L (ref 10–44)
ANION GAP SERPL CALC-SCNC: 11 MMOL/L (ref 8–16)
AST SERPL-CCNC: 15 U/L (ref 10–40)
BASOPHILS # BLD AUTO: 0.05 K/UL (ref 0–0.2)
BASOPHILS NFR BLD: 0.4 % (ref 0–1.9)
BILIRUB SERPL-MCNC: 0.7 MG/DL (ref 0.1–1)
BUN SERPL-MCNC: 15 MG/DL (ref 6–20)
CALCIUM SERPL-MCNC: 8.6 MG/DL (ref 8.7–10.5)
CHLORIDE SERPL-SCNC: 102 MMOL/L (ref 95–110)
CO2 SERPL-SCNC: 25 MMOL/L (ref 23–29)
CREAT SERPL-MCNC: 0.7 MG/DL (ref 0.5–1.4)
DIFFERENTIAL METHOD: ABNORMAL
EOSINOPHIL # BLD AUTO: 0.3 K/UL (ref 0–0.5)
EOSINOPHIL NFR BLD: 2.2 % (ref 0–8)
ERYTHROCYTE [DISTWIDTH] IN BLOOD BY AUTOMATED COUNT: 17.1 % (ref 11.5–14.5)
EST. GFR  (AFRICAN AMERICAN): >60 ML/MIN/1.73 M^2
EST. GFR  (NON AFRICAN AMERICAN): >60 ML/MIN/1.73 M^2
FOLATE SERPL-MCNC: 7.8 NG/ML (ref 4–24)
GLUCOSE SERPL-MCNC: 83 MG/DL (ref 70–110)
HCT VFR BLD AUTO: 44 % (ref 40–54)
HGB BLD-MCNC: 13 G/DL (ref 14–18)
IMM GRANULOCYTES # BLD AUTO: 0.07 K/UL (ref 0–0.04)
IMM GRANULOCYTES NFR BLD AUTO: 0.5 % (ref 0–0.5)
IRON SERPL-MCNC: 41 UG/DL (ref 45–160)
LYMPHOCYTES # BLD AUTO: 2.5 K/UL (ref 1–4.8)
LYMPHOCYTES NFR BLD: 18.5 % (ref 18–48)
MCH RBC QN AUTO: 26.6 PG (ref 27–31)
MCHC RBC AUTO-ENTMCNC: 29.5 G/DL (ref 32–36)
MCV RBC AUTO: 90 FL (ref 82–98)
MONOCYTES # BLD AUTO: 0.8 K/UL (ref 0.3–1)
MONOCYTES NFR BLD: 6.3 % (ref 4–15)
NEUTROPHILS # BLD AUTO: 9.6 K/UL (ref 1.8–7.7)
NEUTROPHILS NFR BLD: 72.1 % (ref 38–73)
NRBC BLD-RTO: 0 /100 WBC
PLATELET # BLD AUTO: 220 K/UL (ref 150–450)
PMV BLD AUTO: 9.4 FL (ref 9.2–12.9)
POTASSIUM SERPL-SCNC: 4.6 MMOL/L (ref 3.5–5.1)
PROT SERPL-MCNC: 7.3 G/DL (ref 6–8.4)
RBC # BLD AUTO: 4.88 M/UL (ref 4.6–6.2)
SATURATED IRON: 12 % (ref 20–50)
SODIUM SERPL-SCNC: 138 MMOL/L (ref 136–145)
TOTAL IRON BINDING CAPACITY: 345 UG/DL (ref 250–450)
TRANSFERRIN SERPL-MCNC: 233 MG/DL (ref 200–375)
VIT B12 SERPL-MCNC: 429 PG/ML (ref 210–950)
WBC # BLD AUTO: 13.27 K/UL (ref 3.9–12.7)

## 2021-05-15 PROCEDURE — 25000242 PHARM REV CODE 250 ALT 637 W/ HCPCS: Performed by: STUDENT IN AN ORGANIZED HEALTH CARE EDUCATION/TRAINING PROGRAM

## 2021-05-15 PROCEDURE — 25000003 PHARM REV CODE 250: Performed by: STUDENT IN AN ORGANIZED HEALTH CARE EDUCATION/TRAINING PROGRAM

## 2021-05-15 PROCEDURE — 94640 AIRWAY INHALATION TREATMENT: CPT

## 2021-05-15 PROCEDURE — 85025 COMPLETE CBC W/AUTO DIFF WBC: CPT | Performed by: STUDENT IN AN ORGANIZED HEALTH CARE EDUCATION/TRAINING PROGRAM

## 2021-05-15 PROCEDURE — 86703 HIV-1/HIV-2 1 RESULT ANTBDY: CPT | Performed by: INTERNAL MEDICINE

## 2021-05-15 PROCEDURE — 80053 COMPREHEN METABOLIC PANEL: CPT | Performed by: STUDENT IN AN ORGANIZED HEALTH CARE EDUCATION/TRAINING PROGRAM

## 2021-05-15 PROCEDURE — 27000221 HC OXYGEN, UP TO 24 HOURS

## 2021-05-15 PROCEDURE — 94761 N-INVAS EAR/PLS OXIMETRY MLT: CPT

## 2021-05-15 PROCEDURE — S4991 NICOTINE PATCH NONLEGEND: HCPCS | Performed by: STUDENT IN AN ORGANIZED HEALTH CARE EDUCATION/TRAINING PROGRAM

## 2021-05-15 PROCEDURE — 63600175 PHARM REV CODE 636 W HCPCS: Performed by: STUDENT IN AN ORGANIZED HEALTH CARE EDUCATION/TRAINING PROGRAM

## 2021-05-15 RX ORDER — PREDNISONE 20 MG/1
TABLET ORAL
Qty: 11 TABLET | Refills: 0 | Status: SHIPPED | OUTPATIENT
Start: 2021-05-15 | End: 2021-05-22

## 2021-05-15 RX ORDER — LEVOFLOXACIN 750 MG/1
750 TABLET ORAL DAILY
Qty: 4 TABLET | Refills: 0 | Status: SHIPPED | OUTPATIENT
Start: 2021-05-15 | End: 2021-05-19

## 2021-05-15 RX ORDER — IBUPROFEN 200 MG
1 TABLET ORAL DAILY
Qty: 14 PATCH | Refills: 0 | Status: SHIPPED | OUTPATIENT
Start: 2021-05-15 | End: 2021-07-16

## 2021-05-15 RX ADMIN — OXYCODONE AND ACETAMINOPHEN 1 TABLET: 7.5; 325 TABLET ORAL at 12:05

## 2021-05-15 RX ADMIN — Medication 1 PATCH: at 08:05

## 2021-05-15 RX ADMIN — PREDNISONE 40 MG: 20 TABLET ORAL at 08:05

## 2021-05-15 RX ADMIN — GABAPENTIN 800 MG: 400 CAPSULE ORAL at 08:05

## 2021-05-15 RX ADMIN — IPRATROPIUM BROMIDE AND ALBUTEROL SULFATE 3 ML: .5; 2.5 SOLUTION RESPIRATORY (INHALATION) at 11:05

## 2021-05-15 RX ADMIN — OXYCODONE AND ACETAMINOPHEN 1 TABLET: 7.5; 325 TABLET ORAL at 05:05

## 2021-05-15 RX ADMIN — TIZANIDINE 4 MG: 4 TABLET ORAL at 08:05

## 2021-05-15 RX ADMIN — IPRATROPIUM BROMIDE AND ALBUTEROL SULFATE 3 ML: .5; 2.5 SOLUTION RESPIRATORY (INHALATION) at 07:05

## 2021-05-15 RX ADMIN — GABAPENTIN 800 MG: 400 CAPSULE ORAL at 02:05

## 2021-05-15 RX ADMIN — ESCITALOPRAM OXALATE 10 MG: 10 TABLET ORAL at 08:05

## 2021-05-15 RX ADMIN — LIDOCAINE 2 PATCH: 50 PATCH TOPICAL at 12:05

## 2021-05-15 RX ADMIN — TIZANIDINE 4 MG: 4 TABLET ORAL at 02:05

## 2021-05-16 LAB
BACTERIA BLD CULT: ABNORMAL

## 2021-05-17 ENCOUNTER — TELEPHONE (OUTPATIENT)
Dept: PRIMARY CARE CLINIC | Facility: CLINIC | Age: 51
End: 2021-05-17

## 2021-05-17 ENCOUNTER — PATIENT OUTREACH (OUTPATIENT)
Dept: ADMINISTRATIVE | Facility: CLINIC | Age: 51
End: 2021-05-17

## 2021-05-17 ENCOUNTER — TELEPHONE (OUTPATIENT)
Dept: PULMONOLOGY | Facility: CLINIC | Age: 51
End: 2021-05-17

## 2021-05-17 ENCOUNTER — PATIENT OUTREACH (OUTPATIENT)
Dept: ADMINISTRATIVE | Facility: OTHER | Age: 51
End: 2021-05-17

## 2021-05-17 LAB
BACTERIA BLD CULT: NORMAL
BACTERIA SPEC AEROBE CULT: NORMAL
BACTERIA SPEC AEROBE CULT: NORMAL
ENTEROVIRUS/RHINOVIRUS: POSITIVE
GAMMA INTERFERON BACKGROUND BLD IA-ACNC: 0.02 IU/ML
GRAM STN SPEC: NORMAL
HIV 1+2 AB+HIV1 P24 AG SERPL QL IA: NEGATIVE
HUMAN BOCAVIRUS: NOT DETECTED
HUMAN CORONAVIRUS, COMMON COLD VIRUS: NOT DETECTED
INFLUENZA A - H1N1-09: NOT DETECTED
LEGIONELLA PNEUMOPHILA: NOT DETECTED
M TB IFN-G CD4+ BCKGRND COR BLD-ACNC: 0.07 IU/ML
MITOGEN IGNF BCKGRD COR BLD-ACNC: 8.44 IU/ML
MORAXELLA CATARRHALIS: NOT DETECTED
PARAINFLUENZA: NOT DETECTED
RVP - ADENOVIRUS: NOT DETECTED
RVP - HUMAN METAPNEUMOVIRUS (HMPV): NOT DETECTED
RVP - INFLUENZA A: NOT DETECTED
RVP - INFLUENZA B: NOT DETECTED
RVP - RESPIRATORY SYNCTIAL VIRUS (RSV) A: NOT DETECTED
RVP - RESPIRATORY VIRAL PANEL, SOURCE: ABNORMAL
TB GOLD PLUS: NEGATIVE
TB2 - NIL: 0.07 IU/ML
TEM - ACINETOBACTER BAUMANNII: NOT DETECTED
TEM - BORDETELLA PERTUSSIS: NOT DETECTED
TEM - CHLAMYDOPHILA PNEUMONIAE: NOT DETECTED
TEM - KLEBSIELLA PNEUMONIAE: NOT DETECTED
TEM - MRSA: NOT DETECTED
TEM - MYCOPLASMA PNEUMONIAE: NOT DETECTED
TEM - NEISSERIA MENINGITIDIS: NOT DETECTED
TEM - PANTON-VALENTINE: NOT DETECTED
TEM - PSEUDOMONAS AERUGINOSA: NOT DETECTED
TEM - STAPHYLOCOCCUS AUREUS: NOT DETECTED
TEM - STREPTOCOCCUS PNEUMONIAE: NOT DETECTED
TEM - STREPTOCOCCUS PYOGENES A: NOT DETECTED
TEM- HAEMOPHILUS INFLUENZAE B: NOT DETECTED
TEM- HAEMOPHILUS INFLUENZAE: NOT DETECTED

## 2021-05-19 ENCOUNTER — TELEPHONE (OUTPATIENT)
Dept: SLEEP MEDICINE | Facility: CLINIC | Age: 51
End: 2021-05-19

## 2021-05-19 LAB
ENTEROVIRUS/RHINOVIRUS: POSITIVE
HUMAN BOCAVIRUS: NOT DETECTED
HUMAN CORONAVIRUS, COMMON COLD VIRUS: NOT DETECTED
INFLUENZA A - H1N1-09: NOT DETECTED
LEGIONELLA PNEUMOPHILA: NOT DETECTED
MORAXELLA CATARRHALIS: NOT DETECTED
PARAINFLUENZA: NOT DETECTED
RVP - ADENOVIRUS: NOT DETECTED
RVP - HUMAN METAPNEUMOVIRUS (HMPV): NOT DETECTED
RVP - INFLUENZA A: NOT DETECTED
RVP - INFLUENZA B: NOT DETECTED
RVP - RESPIRATORY SYNCTIAL VIRUS (RSV) A: NOT DETECTED
RVP - RESPIRATORY VIRAL PANEL, SOURCE: ABNORMAL
TEM - ACINETOBACTER BAUMANNII: NOT DETECTED
TEM - BORDETELLA PERTUSSIS: NOT DETECTED
TEM - CHLAMYDOPHILA PNEUMONIAE: NOT DETECTED
TEM - KLEBSIELLA PNEUMONIAE: NOT DETECTED
TEM - MRSA: NOT DETECTED
TEM - MYCOPLASMA PNEUMONIAE: NOT DETECTED
TEM - NEISSERIA MENINGITIDIS: NOT DETECTED
TEM - PANTON-VALENTINE: NOT DETECTED
TEM - PSEUDOMONAS AERUGINOSA: NOT DETECTED
TEM - STAPHYLOCOCCUS AUREUS: NOT DETECTED
TEM - STREPTOCOCCUS PNEUMONIAE: NOT DETECTED
TEM - STREPTOCOCCUS PYOGENES A: NOT DETECTED
TEM- HAEMOPHILUS INFLUENZAE B: NOT DETECTED
TEM- HAEMOPHILUS INFLUENZAE: NOT DETECTED

## 2021-05-20 ENCOUNTER — OFFICE VISIT (OUTPATIENT)
Dept: SLEEP MEDICINE | Facility: CLINIC | Age: 51
End: 2021-05-20
Payer: COMMERCIAL

## 2021-05-20 ENCOUNTER — OFFICE VISIT (OUTPATIENT)
Dept: PULMONOLOGY | Facility: CLINIC | Age: 51
End: 2021-05-20
Payer: COMMERCIAL

## 2021-05-20 ENCOUNTER — PATIENT MESSAGE (OUTPATIENT)
Dept: SLEEP MEDICINE | Facility: CLINIC | Age: 51
End: 2021-05-20

## 2021-05-20 VITALS
SYSTOLIC BLOOD PRESSURE: 121 MMHG | BODY MASS INDEX: 54.54 KG/M2 | WEIGHT: 315 LBS | OXYGEN SATURATION: 93 % | DIASTOLIC BLOOD PRESSURE: 62 MMHG | HEART RATE: 96 BPM | RESPIRATION RATE: 20 BRPM

## 2021-05-20 DIAGNOSIS — J96.11 CHRONIC RESPIRATORY FAILURE WITH HYPOXIA AND HYPERCAPNIA: ICD-10-CM

## 2021-05-20 DIAGNOSIS — E66.2 OBESITY HYPOVENTILATION SYNDROME: Primary | ICD-10-CM

## 2021-05-20 DIAGNOSIS — F17.210 CIGARETTE NICOTINE DEPENDENCE WITHOUT COMPLICATION: ICD-10-CM

## 2021-05-20 DIAGNOSIS — Z01.811 ENCOUNTER FOR PREOPERATIVE PULMONARY EXAMINATION: ICD-10-CM

## 2021-05-20 DIAGNOSIS — J96.12 CHRONIC RESPIRATORY FAILURE WITH HYPOXIA AND HYPERCAPNIA: ICD-10-CM

## 2021-05-20 DIAGNOSIS — J18.9 PNEUMONIA OF RIGHT LOWER LOBE DUE TO INFECTIOUS ORGANISM: ICD-10-CM

## 2021-05-20 DIAGNOSIS — G47.30 SLEEP APNEA, UNSPECIFIED TYPE: Primary | ICD-10-CM

## 2021-05-20 PROCEDURE — 99204 PR OFFICE/OUTPT VISIT, NEW, LEVL IV, 45-59 MIN: ICD-10-PCS | Mod: S$GLB,,, | Performed by: INTERNAL MEDICINE

## 2021-05-20 PROCEDURE — 99204 OFFICE O/P NEW MOD 45 MIN: CPT | Mod: S$GLB,,, | Performed by: INTERNAL MEDICINE

## 2021-05-20 PROCEDURE — 99204 OFFICE O/P NEW MOD 45 MIN: CPT | Mod: 95,CR,S$GLB, | Performed by: NURSE PRACTITIONER

## 2021-05-20 PROCEDURE — 3008F PR BODY MASS INDEX (BMI) DOCUMENTED: ICD-10-PCS | Mod: CPTII,S$GLB,, | Performed by: INTERNAL MEDICINE

## 2021-05-20 PROCEDURE — 99999 PR PBB SHADOW E&M-EST. PATIENT-LVL III: CPT | Mod: PBBFAC,,, | Performed by: INTERNAL MEDICINE

## 2021-05-20 PROCEDURE — 1125F AMNT PAIN NOTED PAIN PRSNT: CPT | Mod: S$GLB,,, | Performed by: INTERNAL MEDICINE

## 2021-05-20 PROCEDURE — 99999 PR PBB SHADOW E&M-EST. PATIENT-LVL III: ICD-10-PCS | Mod: PBBFAC,,, | Performed by: INTERNAL MEDICINE

## 2021-05-20 PROCEDURE — 99204 PR OFFICE/OUTPT VISIT, NEW, LEVL IV, 45-59 MIN: ICD-10-PCS | Mod: 95,CR,S$GLB, | Performed by: NURSE PRACTITIONER

## 2021-05-20 PROCEDURE — 3008F BODY MASS INDEX DOCD: CPT | Mod: CPTII,S$GLB,, | Performed by: INTERNAL MEDICINE

## 2021-05-20 PROCEDURE — 1125F PR PAIN SEVERITY QUANTIFIED, PAIN PRESENT: ICD-10-PCS | Mod: S$GLB,,, | Performed by: INTERNAL MEDICINE

## 2021-05-25 ENCOUNTER — OFFICE VISIT (OUTPATIENT)
Dept: PRIMARY CARE CLINIC | Facility: CLINIC | Age: 51
End: 2021-05-25
Payer: COMMERCIAL

## 2021-05-25 ENCOUNTER — TELEPHONE (OUTPATIENT)
Dept: PRIMARY CARE CLINIC | Facility: CLINIC | Age: 51
End: 2021-05-25

## 2021-05-25 VITALS
HEART RATE: 79 BPM | SYSTOLIC BLOOD PRESSURE: 107 MMHG | TEMPERATURE: 99 F | OXYGEN SATURATION: 94 % | WEIGHT: 315 LBS | BODY MASS INDEX: 54.81 KG/M2 | DIASTOLIC BLOOD PRESSURE: 70 MMHG

## 2021-05-25 DIAGNOSIS — M54.16 LUMBAR RADICULOPATHY: ICD-10-CM

## 2021-05-25 DIAGNOSIS — J44.1 CHRONIC OBSTRUCTIVE PULMONARY DISEASE WITH ACUTE EXACERBATION: Primary | ICD-10-CM

## 2021-05-25 DIAGNOSIS — E66.2 OBESITY HYPOVENTILATION SYNDROME: ICD-10-CM

## 2021-05-25 DIAGNOSIS — Z72.0 TOBACCO ABUSE: ICD-10-CM

## 2021-05-25 DIAGNOSIS — E11.9 TYPE 2 DIABETES MELLITUS WITHOUT COMPLICATION, WITHOUT LONG-TERM CURRENT USE OF INSULIN: ICD-10-CM

## 2021-05-25 DIAGNOSIS — G47.33 OSA (OBSTRUCTIVE SLEEP APNEA): ICD-10-CM

## 2021-05-25 DIAGNOSIS — E66.01 MORBID OBESITY: ICD-10-CM

## 2021-05-25 PROBLEM — R06.02 SHORTNESS OF BREATH: Status: RESOLVED | Noted: 2021-05-12 | Resolved: 2021-05-25

## 2021-05-25 PROCEDURE — 99999 PR PBB SHADOW E&M-EST. PATIENT-LVL IV: CPT | Mod: PBBFAC,,, | Performed by: INTERNAL MEDICINE

## 2021-05-25 PROCEDURE — 99205 PR OFFICE/OUTPT VISIT, NEW, LEVL V, 60-74 MIN: ICD-10-PCS | Mod: S$GLB,,, | Performed by: INTERNAL MEDICINE

## 2021-05-25 PROCEDURE — 1125F AMNT PAIN NOTED PAIN PRSNT: CPT | Mod: S$GLB,,, | Performed by: INTERNAL MEDICINE

## 2021-05-25 PROCEDURE — 3008F PR BODY MASS INDEX (BMI) DOCUMENTED: ICD-10-PCS | Mod: CPTII,S$GLB,, | Performed by: INTERNAL MEDICINE

## 2021-05-25 PROCEDURE — 99999 PR PBB SHADOW E&M-EST. PATIENT-LVL IV: ICD-10-PCS | Mod: PBBFAC,,, | Performed by: INTERNAL MEDICINE

## 2021-05-25 PROCEDURE — 99205 OFFICE O/P NEW HI 60 MIN: CPT | Mod: S$GLB,,, | Performed by: INTERNAL MEDICINE

## 2021-05-25 PROCEDURE — 3008F BODY MASS INDEX DOCD: CPT | Mod: CPTII,S$GLB,, | Performed by: INTERNAL MEDICINE

## 2021-05-25 PROCEDURE — 1125F PR PAIN SEVERITY QUANTIFIED, PAIN PRESENT: ICD-10-PCS | Mod: S$GLB,,, | Performed by: INTERNAL MEDICINE

## 2021-05-25 RX ORDER — LANCETS
1 EACH MISCELLANEOUS DAILY
Qty: 100 EACH | Refills: 6 | Status: SHIPPED | OUTPATIENT
Start: 2021-05-25 | End: 2022-05-25

## 2021-05-25 RX ORDER — METFORMIN HYDROCHLORIDE 500 MG/1
500 TABLET ORAL 2 TIMES DAILY WITH MEALS
Qty: 180 TABLET | Refills: 3 | Status: SHIPPED | OUTPATIENT
Start: 2021-05-25 | End: 2021-07-16

## 2021-05-25 RX ORDER — DEXTROSE 4 G
1 TABLET,CHEWABLE ORAL DAILY
Qty: 1 EACH | Refills: 0 | Status: SHIPPED | OUTPATIENT
Start: 2021-05-25 | End: 2022-05-25

## 2021-06-02 ENCOUNTER — CLINICAL SUPPORT (OUTPATIENT)
Dept: SMOKING CESSATION | Facility: CLINIC | Age: 51
End: 2021-06-02
Payer: COMMERCIAL

## 2021-06-02 VITALS
WEIGHT: 315 LBS | DIASTOLIC BLOOD PRESSURE: 52 MMHG | SYSTOLIC BLOOD PRESSURE: 109 MMHG | BODY MASS INDEX: 40.43 KG/M2 | HEART RATE: 74 BPM | HEIGHT: 74 IN | OXYGEN SATURATION: 91 %

## 2021-06-02 DIAGNOSIS — F17.210 CIGARETTE NICOTINE DEPENDENCE, UNCOMPLICATED: Primary | ICD-10-CM

## 2021-06-02 PROCEDURE — 99404 PR PREVENT COUNSEL,INDIV,60 MIN: ICD-10-PCS | Mod: S$GLB,,,

## 2021-06-02 PROCEDURE — 99999 PR PBB SHADOW E&M-EST. PATIENT-LVL II: CPT | Mod: PBBFAC,,,

## 2021-06-02 PROCEDURE — 99404 PREV MED CNSL INDIV APPRX 60: CPT | Mod: S$GLB,,,

## 2021-06-02 PROCEDURE — 99999 PR PBB SHADOW E&M-EST. PATIENT-LVL II: ICD-10-PCS | Mod: PBBFAC,,,

## 2021-06-02 RX ORDER — DM/P-EPHED/ACETAMINOPH/DOXYLAM 30-7.5/3
2 LIQUID (ML) ORAL
Qty: 108 LOZENGE | Refills: 0 | Status: SHIPPED | OUTPATIENT
Start: 2021-06-02 | End: 2021-06-02

## 2021-06-02 RX ORDER — DM/P-EPHED/ACETAMINOPH/DOXYLAM 30-7.5/3
2 LIQUID (ML) ORAL
Qty: 81 LOZENGE | Refills: 0 | Status: SHIPPED | OUTPATIENT
Start: 2021-06-02 | End: 2021-07-16

## 2021-06-02 RX ORDER — BUPROPION HYDROCHLORIDE 150 MG/1
150 TABLET, EXTENDED RELEASE ORAL 2 TIMES DAILY
Qty: 60 TABLET | Refills: 0 | Status: SHIPPED | OUTPATIENT
Start: 2021-06-02 | End: 2021-07-15 | Stop reason: SDUPTHER

## 2021-06-02 RX ORDER — IBUPROFEN 200 MG
1 TABLET ORAL DAILY
Qty: 28 PATCH | Refills: 0 | Status: SHIPPED | OUTPATIENT
Start: 2021-06-02 | End: 2021-07-16

## 2021-06-03 ENCOUNTER — TELEPHONE (OUTPATIENT)
Dept: PULMONOLOGY | Facility: CLINIC | Age: 51
End: 2021-06-03

## 2021-06-05 ENCOUNTER — TELEPHONE (OUTPATIENT)
Dept: FAMILY MEDICINE | Facility: HOSPITAL | Age: 51
End: 2021-06-05

## 2021-06-13 ENCOUNTER — DOCUMENTATION ONLY (OUTPATIENT)
Dept: BARIATRICS | Facility: CLINIC | Age: 51
End: 2021-06-13

## 2021-06-14 ENCOUNTER — TELEPHONE (OUTPATIENT)
Dept: BARIATRICS | Facility: CLINIC | Age: 51
End: 2021-06-14

## 2021-06-15 ENCOUNTER — OFFICE VISIT (OUTPATIENT)
Dept: PRIMARY CARE CLINIC | Facility: CLINIC | Age: 51
End: 2021-06-15
Payer: COMMERCIAL

## 2021-06-15 ENCOUNTER — CLINICAL SUPPORT (OUTPATIENT)
Dept: SMOKING CESSATION | Facility: CLINIC | Age: 51
End: 2021-06-15
Payer: COMMERCIAL

## 2021-06-15 ENCOUNTER — CLINICAL SUPPORT (OUTPATIENT)
Dept: DIABETES | Facility: CLINIC | Age: 51
End: 2021-06-15
Payer: COMMERCIAL

## 2021-06-15 VITALS
SYSTOLIC BLOOD PRESSURE: 122 MMHG | WEIGHT: 315 LBS | TEMPERATURE: 99 F | HEART RATE: 78 BPM | DIASTOLIC BLOOD PRESSURE: 76 MMHG | OXYGEN SATURATION: 98 % | BODY MASS INDEX: 54.91 KG/M2

## 2021-06-15 DIAGNOSIS — J44.1 CHRONIC OBSTRUCTIVE PULMONARY DISEASE WITH ACUTE EXACERBATION: Primary | ICD-10-CM

## 2021-06-15 DIAGNOSIS — M54.16 LUMBAR RADICULOPATHY: ICD-10-CM

## 2021-06-15 DIAGNOSIS — G47.33 OSA (OBSTRUCTIVE SLEEP APNEA): ICD-10-CM

## 2021-06-15 DIAGNOSIS — E11.9 TYPE 2 DIABETES MELLITUS WITHOUT COMPLICATION, WITHOUT LONG-TERM CURRENT USE OF INSULIN: ICD-10-CM

## 2021-06-15 DIAGNOSIS — E66.2 OBESITY HYPOVENTILATION SYNDROME: ICD-10-CM

## 2021-06-15 DIAGNOSIS — Z72.0 TOBACCO ABUSE: ICD-10-CM

## 2021-06-15 DIAGNOSIS — F17.210 CIGARETTE NICOTINE DEPENDENCE, UNCOMPLICATED: Primary | ICD-10-CM

## 2021-06-15 DIAGNOSIS — G47.09 OTHER INSOMNIA: ICD-10-CM

## 2021-06-15 DIAGNOSIS — E66.01 MORBID OBESITY: ICD-10-CM

## 2021-06-15 PROCEDURE — 99214 OFFICE O/P EST MOD 30 MIN: CPT | Mod: S$GLB,,, | Performed by: INTERNAL MEDICINE

## 2021-06-15 PROCEDURE — 99999 PR PBB SHADOW E&M-EST. PATIENT-LVL IV: ICD-10-PCS | Mod: PBBFAC,,, | Performed by: INTERNAL MEDICINE

## 2021-06-15 PROCEDURE — 3008F BODY MASS INDEX DOCD: CPT | Mod: CPTII,S$GLB,, | Performed by: INTERNAL MEDICINE

## 2021-06-15 PROCEDURE — 99402 PR PREVENT COUNSEL,INDIV,30 MIN: ICD-10-PCS | Mod: S$GLB,,,

## 2021-06-15 PROCEDURE — 1125F AMNT PAIN NOTED PAIN PRSNT: CPT | Mod: S$GLB,,, | Performed by: INTERNAL MEDICINE

## 2021-06-15 PROCEDURE — 99999 PR PBB SHADOW E&M-EST. PATIENT-LVL I: CPT | Mod: PBBFAC,,, | Performed by: DIETITIAN, REGISTERED

## 2021-06-15 PROCEDURE — 99214 PR OFFICE/OUTPT VISIT, EST, LEVL IV, 30-39 MIN: ICD-10-PCS | Mod: S$GLB,,, | Performed by: INTERNAL MEDICINE

## 2021-06-15 PROCEDURE — 99999 PR PBB SHADOW E&M-EST. PATIENT-LVL I: CPT | Mod: PBBFAC,,,

## 2021-06-15 PROCEDURE — 99402 PREV MED CNSL INDIV APPRX 30: CPT | Mod: S$GLB,,,

## 2021-06-15 PROCEDURE — 1125F PR PAIN SEVERITY QUANTIFIED, PAIN PRESENT: ICD-10-PCS | Mod: S$GLB,,, | Performed by: INTERNAL MEDICINE

## 2021-06-15 PROCEDURE — 99999 PR PBB SHADOW E&M-EST. PATIENT-LVL IV: CPT | Mod: PBBFAC,,, | Performed by: INTERNAL MEDICINE

## 2021-06-15 PROCEDURE — 99999 PR PBB SHADOW E&M-EST. PATIENT-LVL I: ICD-10-PCS | Mod: PBBFAC,,,

## 2021-06-15 PROCEDURE — 3008F PR BODY MASS INDEX (BMI) DOCUMENTED: ICD-10-PCS | Mod: CPTII,S$GLB,, | Performed by: INTERNAL MEDICINE

## 2021-06-15 PROCEDURE — 99999 PR PBB SHADOW E&M-EST. PATIENT-LVL I: ICD-10-PCS | Mod: PBBFAC,,, | Performed by: DIETITIAN, REGISTERED

## 2021-06-15 PROCEDURE — G0108 PR DIAB MANAGE TRN  PER INDIV: ICD-10-PCS | Mod: S$GLB,,, | Performed by: DIETITIAN, REGISTERED

## 2021-06-15 PROCEDURE — G0108 DIAB MANAGE TRN  PER INDIV: HCPCS | Mod: S$GLB,,, | Performed by: DIETITIAN, REGISTERED

## 2021-06-15 RX ORDER — TRAZODONE HYDROCHLORIDE 50 MG/1
50 TABLET ORAL NIGHTLY
Qty: 30 TABLET | Refills: 11 | Status: SHIPPED | OUTPATIENT
Start: 2021-06-15 | End: 2021-06-18 | Stop reason: SDUPTHER

## 2021-06-15 RX ORDER — MICONAZOLE NITRATE 2 %
2 CREAM (GRAM) TOPICAL
Qty: 200 EACH | Refills: 0 | Status: SHIPPED | OUTPATIENT
Start: 2021-06-15 | End: 2021-07-16

## 2021-06-15 RX ORDER — LANOLIN ALCOHOL/MO/W.PET/CERES
50 CREAM (GRAM) TOPICAL DAILY
COMMUNITY

## 2021-06-16 ENCOUNTER — PATIENT MESSAGE (OUTPATIENT)
Dept: DIABETES | Facility: CLINIC | Age: 51
End: 2021-06-16

## 2021-06-17 ENCOUNTER — TELEPHONE (OUTPATIENT)
Dept: PRIMARY CARE CLINIC | Facility: CLINIC | Age: 51
End: 2021-06-17

## 2021-06-18 ENCOUNTER — TELEPHONE (OUTPATIENT)
Dept: SLEEP MEDICINE | Facility: OTHER | Age: 51
End: 2021-06-18

## 2021-06-18 ENCOUNTER — TELEPHONE (OUTPATIENT)
Dept: PRIMARY CARE CLINIC | Facility: CLINIC | Age: 51
End: 2021-06-18

## 2021-06-18 DIAGNOSIS — G47.09 OTHER INSOMNIA: ICD-10-CM

## 2021-06-18 RX ORDER — TRAZODONE HYDROCHLORIDE 50 MG/1
100 TABLET ORAL NIGHTLY
Qty: 60 TABLET | Refills: 11 | Status: SHIPPED | OUTPATIENT
Start: 2021-06-18 | End: 2022-09-13

## 2021-06-26 ENCOUNTER — HOSPITAL ENCOUNTER (EMERGENCY)
Facility: HOSPITAL | Age: 51
Discharge: HOME | End: 2021-06-26
Attending: FAMILY MEDICINE
Payer: COMMERCIAL

## 2021-06-26 VITALS
HEIGHT: 73 IN | WEIGHT: 315 LBS | SYSTOLIC BLOOD PRESSURE: 125 MMHG | BODY MASS INDEX: 41.75 KG/M2 | DIASTOLIC BLOOD PRESSURE: 75 MMHG | TEMPERATURE: 98 F | OXYGEN SATURATION: 96 % | RESPIRATION RATE: 20 BRPM | HEART RATE: 95 BPM

## 2021-06-26 DIAGNOSIS — H60.332 ACUTE SWIMMER'S EAR OF LEFT SIDE: Primary | ICD-10-CM

## 2021-06-26 PROCEDURE — 63600175 PHARM REV CODE 636 W HCPCS: Mod: ER | Performed by: PHYSICIAN ASSISTANT

## 2021-06-26 PROCEDURE — 99284 EMERGENCY DEPT VISIT MOD MDM: CPT | Mod: 25,ER

## 2021-06-26 PROCEDURE — 96372 THER/PROPH/DIAG INJ SC/IM: CPT | Mod: ER

## 2021-06-26 RX ORDER — KETOROLAC TROMETHAMINE 30 MG/ML
15 INJECTION, SOLUTION INTRAMUSCULAR; INTRAVENOUS
Status: COMPLETED | OUTPATIENT
Start: 2021-06-26 | End: 2021-06-26

## 2021-06-26 RX ORDER — CIPROFLOXACIN AND DEXAMETHASONE 3; 1 MG/ML; MG/ML
4 SUSPENSION/ DROPS AURICULAR (OTIC) 2 TIMES DAILY
Qty: 7.5 ML | Refills: 0 | Status: SHIPPED | OUTPATIENT
Start: 2021-06-26 | End: 2021-07-03

## 2021-06-26 RX ADMIN — KETOROLAC TROMETHAMINE 15 MG: 30 INJECTION, SOLUTION INTRAMUSCULAR; INTRAVENOUS at 05:06

## 2021-06-30 ENCOUNTER — TELEPHONE (OUTPATIENT)
Dept: SLEEP MEDICINE | Facility: OTHER | Age: 51
End: 2021-06-30

## 2021-07-06 ENCOUNTER — TELEPHONE (OUTPATIENT)
Dept: PULMONOLOGY | Facility: CLINIC | Age: 51
End: 2021-07-06

## 2021-07-06 ENCOUNTER — PATIENT MESSAGE (OUTPATIENT)
Dept: PULMONOLOGY | Facility: CLINIC | Age: 51
End: 2021-07-06

## 2021-07-06 ENCOUNTER — OFFICE VISIT (OUTPATIENT)
Dept: PRIMARY CARE CLINIC | Facility: CLINIC | Age: 51
End: 2021-07-06
Payer: COMMERCIAL

## 2021-07-06 ENCOUNTER — LAB VISIT (OUTPATIENT)
Dept: LAB | Facility: HOSPITAL | Age: 51
End: 2021-07-06
Attending: INTERNAL MEDICINE
Payer: COMMERCIAL

## 2021-07-06 VITALS
HEIGHT: 73 IN | SYSTOLIC BLOOD PRESSURE: 108 MMHG | HEART RATE: 79 BPM | TEMPERATURE: 98 F | WEIGHT: 315 LBS | OXYGEN SATURATION: 92 % | BODY MASS INDEX: 41.75 KG/M2 | DIASTOLIC BLOOD PRESSURE: 77 MMHG

## 2021-07-06 DIAGNOSIS — M54.16 LUMBAR RADICULOPATHY: ICD-10-CM

## 2021-07-06 DIAGNOSIS — E11.9 TYPE 2 DIABETES MELLITUS WITHOUT COMPLICATION, WITHOUT LONG-TERM CURRENT USE OF INSULIN: ICD-10-CM

## 2021-07-06 DIAGNOSIS — J44.1 CHRONIC OBSTRUCTIVE PULMONARY DISEASE WITH ACUTE EXACERBATION: Primary | ICD-10-CM

## 2021-07-06 DIAGNOSIS — G47.33 OSA (OBSTRUCTIVE SLEEP APNEA): ICD-10-CM

## 2021-07-06 DIAGNOSIS — R06.02 SHORTNESS OF BREATH: ICD-10-CM

## 2021-07-06 DIAGNOSIS — E66.01 MORBID OBESITY: ICD-10-CM

## 2021-07-06 DIAGNOSIS — E66.2 OBESITY HYPOVENTILATION SYNDROME: ICD-10-CM

## 2021-07-06 DIAGNOSIS — Z72.0 TOBACCO ABUSE: ICD-10-CM

## 2021-07-06 LAB — TSH SERPL DL<=0.005 MIU/L-ACNC: 1.06 UIU/ML (ref 0.4–4)

## 2021-07-06 PROCEDURE — 3008F BODY MASS INDEX DOCD: CPT | Mod: CPTII,S$GLB,, | Performed by: INTERNAL MEDICINE

## 2021-07-06 PROCEDURE — 99213 PR OFFICE/OUTPT VISIT, EST, LEVL III, 20-29 MIN: ICD-10-PCS | Mod: S$GLB,,, | Performed by: INTERNAL MEDICINE

## 2021-07-06 PROCEDURE — 1125F AMNT PAIN NOTED PAIN PRSNT: CPT | Mod: S$GLB,,, | Performed by: INTERNAL MEDICINE

## 2021-07-06 PROCEDURE — 36415 COLL VENOUS BLD VENIPUNCTURE: CPT | Performed by: INTERNAL MEDICINE

## 2021-07-06 PROCEDURE — 3008F PR BODY MASS INDEX (BMI) DOCUMENTED: ICD-10-PCS | Mod: CPTII,S$GLB,, | Performed by: INTERNAL MEDICINE

## 2021-07-06 PROCEDURE — 99999 PR PBB SHADOW E&M-EST. PATIENT-LVL IV: ICD-10-PCS | Mod: PBBFAC,,, | Performed by: INTERNAL MEDICINE

## 2021-07-06 PROCEDURE — 1125F PR PAIN SEVERITY QUANTIFIED, PAIN PRESENT: ICD-10-PCS | Mod: S$GLB,,, | Performed by: INTERNAL MEDICINE

## 2021-07-06 PROCEDURE — 84443 ASSAY THYROID STIM HORMONE: CPT | Performed by: INTERNAL MEDICINE

## 2021-07-06 PROCEDURE — 99999 PR PBB SHADOW E&M-EST. PATIENT-LVL IV: CPT | Mod: PBBFAC,,, | Performed by: INTERNAL MEDICINE

## 2021-07-06 PROCEDURE — 99213 OFFICE O/P EST LOW 20 MIN: CPT | Mod: S$GLB,,, | Performed by: INTERNAL MEDICINE

## 2021-07-08 PROCEDURE — 95810 POLYSOM 6/> YRS 4/> PARAM: CPT | Mod: 26,,, | Performed by: INTERNAL MEDICINE

## 2021-07-08 PROCEDURE — 95810 PR POLYSOMNOGRAPHY, 4 OR MORE: ICD-10-PCS | Mod: 26,,, | Performed by: INTERNAL MEDICINE

## 2021-07-09 ENCOUNTER — TELEPHONE (OUTPATIENT)
Dept: SLEEP MEDICINE | Facility: OTHER | Age: 51
End: 2021-07-09

## 2021-07-09 ENCOUNTER — HOSPITAL ENCOUNTER (OUTPATIENT)
Dept: SLEEP MEDICINE | Facility: OTHER | Age: 51
Discharge: HOME | End: 2021-07-09
Payer: COMMERCIAL

## 2021-07-09 DIAGNOSIS — G47.30 SLEEP APNEA, UNSPECIFIED TYPE: ICD-10-CM

## 2021-07-09 PROCEDURE — 95810 POLYSOM 6/> YRS 4/> PARAM: CPT

## 2021-07-15 ENCOUNTER — CLINICAL SUPPORT (OUTPATIENT)
Dept: SMOKING CESSATION | Facility: CLINIC | Age: 51
End: 2021-07-15
Payer: COMMERCIAL

## 2021-07-15 DIAGNOSIS — F17.210 CIGARETTE NICOTINE DEPENDENCE, UNCOMPLICATED: Primary | ICD-10-CM

## 2021-07-15 PROCEDURE — 99401 PREV MED CNSL INDIV APPRX 15: CPT | Mod: S$GLB,,,

## 2021-07-15 PROCEDURE — 99401 PR PREVENT COUNSEL,INDIV,15 MIN: ICD-10-PCS | Mod: S$GLB,,,

## 2021-07-15 PROCEDURE — 99999 PR PBB SHADOW E&M-EST. PATIENT-LVL I: CPT | Mod: PBBFAC,,,

## 2021-07-15 PROCEDURE — 99999 PR PBB SHADOW E&M-EST. PATIENT-LVL I: ICD-10-PCS | Mod: PBBFAC,,,

## 2021-07-15 RX ORDER — BUPROPION HYDROCHLORIDE 150 MG/1
150 TABLET, EXTENDED RELEASE ORAL 2 TIMES DAILY
Qty: 60 TABLET | Refills: 0 | Status: SHIPPED | OUTPATIENT
Start: 2021-07-15 | End: 2021-08-13

## 2021-07-16 ENCOUNTER — OFFICE VISIT (OUTPATIENT)
Dept: ENDOCRINOLOGY | Facility: CLINIC | Age: 51
End: 2021-07-16
Payer: COMMERCIAL

## 2021-07-16 VITALS
OXYGEN SATURATION: 98 % | DIASTOLIC BLOOD PRESSURE: 86 MMHG | HEIGHT: 73 IN | WEIGHT: 315 LBS | BODY MASS INDEX: 41.75 KG/M2 | SYSTOLIC BLOOD PRESSURE: 132 MMHG | HEART RATE: 56 BPM

## 2021-07-16 DIAGNOSIS — E11.9 TYPE 2 DIABETES MELLITUS WITHOUT COMPLICATION, WITHOUT LONG-TERM CURRENT USE OF INSULIN: Primary | ICD-10-CM

## 2021-07-16 DIAGNOSIS — E66.01 MORBID OBESITY: ICD-10-CM

## 2021-07-16 PROCEDURE — 1126F PR PAIN SEVERITY QUANTIFIED, NO PAIN PRESENT: ICD-10-PCS | Mod: S$GLB,,, | Performed by: INTERNAL MEDICINE

## 2021-07-16 PROCEDURE — 99999 PR PBB SHADOW E&M-EST. PATIENT-LVL IV: CPT | Mod: PBBFAC,,, | Performed by: INTERNAL MEDICINE

## 2021-07-16 PROCEDURE — 99999 PR PBB SHADOW E&M-EST. PATIENT-LVL IV: ICD-10-PCS | Mod: PBBFAC,,, | Performed by: INTERNAL MEDICINE

## 2021-07-16 PROCEDURE — 99204 OFFICE O/P NEW MOD 45 MIN: CPT | Mod: S$GLB,,, | Performed by: INTERNAL MEDICINE

## 2021-07-16 PROCEDURE — 3044F PR MOST RECENT HEMOGLOBIN A1C LEVEL <7.0%: ICD-10-PCS | Mod: CPTII,S$GLB,, | Performed by: INTERNAL MEDICINE

## 2021-07-16 PROCEDURE — 3008F PR BODY MASS INDEX (BMI) DOCUMENTED: ICD-10-PCS | Mod: CPTII,S$GLB,, | Performed by: INTERNAL MEDICINE

## 2021-07-16 PROCEDURE — 3008F BODY MASS INDEX DOCD: CPT | Mod: CPTII,S$GLB,, | Performed by: INTERNAL MEDICINE

## 2021-07-16 PROCEDURE — 1126F AMNT PAIN NOTED NONE PRSNT: CPT | Mod: S$GLB,,, | Performed by: INTERNAL MEDICINE

## 2021-07-16 PROCEDURE — 99204 PR OFFICE/OUTPT VISIT, NEW, LEVL IV, 45-59 MIN: ICD-10-PCS | Mod: S$GLB,,, | Performed by: INTERNAL MEDICINE

## 2021-07-16 PROCEDURE — 3044F HG A1C LEVEL LT 7.0%: CPT | Mod: CPTII,S$GLB,, | Performed by: INTERNAL MEDICINE

## 2021-07-16 RX ORDER — METFORMIN HYDROCHLORIDE 1000 MG/1
1000 TABLET ORAL 2 TIMES DAILY WITH MEALS
Qty: 180 TABLET | Refills: 3 | Status: SHIPPED | OUTPATIENT
Start: 2021-07-16 | End: 2022-12-01

## 2021-07-16 RX ORDER — ATORVASTATIN CALCIUM 20 MG/1
20 TABLET, FILM COATED ORAL DAILY
Qty: 90 TABLET | Refills: 3 | Status: SHIPPED | OUTPATIENT
Start: 2021-07-16 | End: 2022-11-22

## 2021-07-16 RX ORDER — SEMAGLUTIDE 1.34 MG/ML
0.5 INJECTION, SOLUTION SUBCUTANEOUS
Qty: 1 PEN | Refills: 11 | Status: SHIPPED | OUTPATIENT
Start: 2021-07-16

## 2021-07-20 ENCOUNTER — CLINICAL SUPPORT (OUTPATIENT)
Dept: BARIATRICS | Facility: CLINIC | Age: 51
End: 2021-07-20
Payer: COMMERCIAL

## 2021-07-20 ENCOUNTER — PATIENT MESSAGE (OUTPATIENT)
Dept: BARIATRICS | Facility: CLINIC | Age: 51
End: 2021-07-20

## 2021-07-20 VITALS — BODY MASS INDEX: 40.43 KG/M2 | HEIGHT: 74 IN | WEIGHT: 315 LBS

## 2021-07-20 DIAGNOSIS — E11.9 TYPE 2 DIABETES MELLITUS WITHOUT COMPLICATION, WITHOUT LONG-TERM CURRENT USE OF INSULIN: ICD-10-CM

## 2021-07-20 DIAGNOSIS — G47.30 SLEEP APNEA, UNSPECIFIED TYPE: ICD-10-CM

## 2021-07-20 DIAGNOSIS — Z71.3 DIETARY COUNSELING AND SURVEILLANCE: ICD-10-CM

## 2021-07-20 DIAGNOSIS — E66.01 MORBID OBESITY WITH BODY MASS INDEX (BMI) OF 50.0 TO 59.9 IN ADULT: ICD-10-CM

## 2021-07-20 PROCEDURE — 99999 PR PBB SHADOW E&M-EST. PATIENT-LVL II: ICD-10-PCS | Mod: PBBFAC,,, | Performed by: DIETITIAN, REGISTERED

## 2021-07-20 PROCEDURE — 97802 PR MED NUTR THER, 1ST, INDIV, EA 15 MIN: ICD-10-PCS | Mod: S$GLB,,, | Performed by: DIETITIAN, REGISTERED

## 2021-07-20 PROCEDURE — 99499 NO LOS: ICD-10-PCS | Mod: S$GLB,,, | Performed by: DIETITIAN, REGISTERED

## 2021-07-20 PROCEDURE — 99999 PR PBB SHADOW E&M-EST. PATIENT-LVL II: CPT | Mod: PBBFAC,,, | Performed by: DIETITIAN, REGISTERED

## 2021-07-20 PROCEDURE — 99499 UNLISTED E&M SERVICE: CPT | Mod: S$GLB,,, | Performed by: DIETITIAN, REGISTERED

## 2021-07-20 PROCEDURE — 97802 MEDICAL NUTRITION INDIV IN: CPT | Mod: S$GLB,,, | Performed by: DIETITIAN, REGISTERED

## 2021-07-21 ENCOUNTER — PATIENT MESSAGE (OUTPATIENT)
Dept: SLEEP MEDICINE | Facility: CLINIC | Age: 51
End: 2021-07-21

## 2021-07-21 DIAGNOSIS — G47.33 OBSTRUCTIVE SLEEP APNEA: Primary | ICD-10-CM

## 2021-07-21 DIAGNOSIS — J44.9 CHRONIC OBSTRUCTIVE PULMONARY DISEASE, UNSPECIFIED COPD TYPE: ICD-10-CM

## 2021-07-26 ENCOUNTER — LAB VISIT (OUTPATIENT)
Dept: SPORTS MEDICINE | Facility: CLINIC | Age: 51
End: 2021-07-26
Payer: COMMERCIAL

## 2021-07-26 DIAGNOSIS — R06.02 SHORTNESS OF BREATH: ICD-10-CM

## 2021-07-26 LAB — SARS-COV-2 RNA RESP QL NAA+PROBE: NOT DETECTED

## 2021-07-26 PROCEDURE — U0003 INFECTIOUS AGENT DETECTION BY NUCLEIC ACID (DNA OR RNA); SEVERE ACUTE RESPIRATORY SYNDROME CORONAVIRUS 2 (SARS-COV-2) (CORONAVIRUS DISEASE [COVID-19]), AMPLIFIED PROBE TECHNIQUE, MAKING USE OF HIGH THROUGHPUT TECHNOLOGIES AS DESCRIBED BY CMS-2020-01-R: HCPCS | Performed by: INTERNAL MEDICINE

## 2021-07-26 PROCEDURE — U0005 INFEC AGEN DETEC AMPLI PROBE: HCPCS | Performed by: INTERNAL MEDICINE

## 2021-07-28 ENCOUNTER — HOSPITAL ENCOUNTER (OUTPATIENT)
Dept: PULMONOLOGY | Facility: CLINIC | Age: 51
Discharge: HOME | End: 2021-07-28
Payer: COMMERCIAL

## 2021-07-28 ENCOUNTER — OFFICE VISIT (OUTPATIENT)
Dept: BARIATRICS | Facility: CLINIC | Age: 51
End: 2021-07-28
Payer: COMMERCIAL

## 2021-07-28 VITALS
HEIGHT: 74 IN | BODY MASS INDEX: 40.43 KG/M2 | HEART RATE: 74 BPM | DIASTOLIC BLOOD PRESSURE: 65 MMHG | SYSTOLIC BLOOD PRESSURE: 112 MMHG | OXYGEN SATURATION: 94 % | WEIGHT: 315 LBS

## 2021-07-28 DIAGNOSIS — G47.30 SLEEP APNEA, UNSPECIFIED TYPE: ICD-10-CM

## 2021-07-28 DIAGNOSIS — J44.1 CHRONIC OBSTRUCTIVE PULMONARY DISEASE WITH ACUTE EXACERBATION: ICD-10-CM

## 2021-07-28 DIAGNOSIS — E66.2 OBESITY HYPOVENTILATION SYNDROME: ICD-10-CM

## 2021-07-28 DIAGNOSIS — E11.9 TYPE 2 DIABETES MELLITUS WITHOUT COMPLICATION, WITHOUT LONG-TERM CURRENT USE OF INSULIN: ICD-10-CM

## 2021-07-28 DIAGNOSIS — E66.01 MORBID OBESITY WITH BODY MASS INDEX (BMI) OF 50.0 TO 59.9 IN ADULT: Primary | ICD-10-CM

## 2021-07-28 DIAGNOSIS — E66.01 MORBID OBESITY: ICD-10-CM

## 2021-07-28 PROCEDURE — 3008F BODY MASS INDEX DOCD: CPT | Mod: CPTII,S$GLB,, | Performed by: PHYSICIAN ASSISTANT

## 2021-07-28 PROCEDURE — 3044F PR MOST RECENT HEMOGLOBIN A1C LEVEL <7.0%: ICD-10-PCS | Mod: CPTII,S$GLB,, | Performed by: PHYSICIAN ASSISTANT

## 2021-07-28 PROCEDURE — 94060 EVALUATION OF WHEEZING: CPT | Mod: S$GLB,,, | Performed by: INTERNAL MEDICINE

## 2021-07-28 PROCEDURE — 1125F PR PAIN SEVERITY QUANTIFIED, PAIN PRESENT: ICD-10-PCS | Mod: CPTII,S$GLB,, | Performed by: PHYSICIAN ASSISTANT

## 2021-07-28 PROCEDURE — 1159F MED LIST DOCD IN RCRD: CPT | Mod: CPTII,S$GLB,, | Performed by: PHYSICIAN ASSISTANT

## 2021-07-28 PROCEDURE — 1160F RVW MEDS BY RX/DR IN RCRD: CPT | Mod: CPTII,S$GLB,, | Performed by: PHYSICIAN ASSISTANT

## 2021-07-28 PROCEDURE — 3008F PR BODY MASS INDEX (BMI) DOCUMENTED: ICD-10-PCS | Mod: CPTII,S$GLB,, | Performed by: PHYSICIAN ASSISTANT

## 2021-07-28 PROCEDURE — 94729 DIFFUSING CAPACITY: CPT | Mod: S$GLB,,, | Performed by: INTERNAL MEDICINE

## 2021-07-28 PROCEDURE — 3044F HG A1C LEVEL LT 7.0%: CPT | Mod: CPTII,S$GLB,, | Performed by: PHYSICIAN ASSISTANT

## 2021-07-28 PROCEDURE — 94727 PR PULM FUNCTION TEST BY GAS: ICD-10-PCS | Mod: S$GLB,,, | Performed by: INTERNAL MEDICINE

## 2021-07-28 PROCEDURE — 1125F AMNT PAIN NOTED PAIN PRSNT: CPT | Mod: CPTII,S$GLB,, | Performed by: PHYSICIAN ASSISTANT

## 2021-07-28 PROCEDURE — 94729 PR C02/MEMBANE DIFFUSE CAPACITY: ICD-10-PCS | Mod: S$GLB,,, | Performed by: INTERNAL MEDICINE

## 2021-07-28 PROCEDURE — 99215 PR OFFICE/OUTPT VISIT, EST, LEVL V, 40-54 MIN: ICD-10-PCS | Mod: S$GLB,,, | Performed by: PHYSICIAN ASSISTANT

## 2021-07-28 PROCEDURE — 1160F PR REVIEW ALL MEDS BY PRESCRIBER/CLIN PHARMACIST DOCUMENTED: ICD-10-PCS | Mod: CPTII,S$GLB,, | Performed by: PHYSICIAN ASSISTANT

## 2021-07-28 PROCEDURE — 99215 OFFICE O/P EST HI 40 MIN: CPT | Mod: S$GLB,,, | Performed by: PHYSICIAN ASSISTANT

## 2021-07-28 PROCEDURE — 1159F PR MEDICATION LIST DOCUMENTED IN MEDICAL RECORD: ICD-10-PCS | Mod: CPTII,S$GLB,, | Performed by: PHYSICIAN ASSISTANT

## 2021-07-28 PROCEDURE — 99999 PR PBB SHADOW E&M-EST. PATIENT-LVL IV: CPT | Mod: PBBFAC,,, | Performed by: PHYSICIAN ASSISTANT

## 2021-07-28 PROCEDURE — 99999 PR PBB SHADOW E&M-EST. PATIENT-LVL IV: ICD-10-PCS | Mod: PBBFAC,,, | Performed by: PHYSICIAN ASSISTANT

## 2021-07-28 PROCEDURE — 94727 GAS DIL/WSHOT DETER LNG VOL: CPT | Mod: S$GLB,,, | Performed by: INTERNAL MEDICINE

## 2021-07-28 PROCEDURE — 94060 PR EVAL OF BRONCHOSPASM: ICD-10-PCS | Mod: S$GLB,,, | Performed by: INTERNAL MEDICINE

## 2021-08-02 ENCOUNTER — LAB VISIT (OUTPATIENT)
Dept: LAB | Facility: HOSPITAL | Age: 51
End: 2021-08-02
Attending: FAMILY MEDICINE
Payer: COMMERCIAL

## 2021-08-02 DIAGNOSIS — G47.30 SLEEP APNEA, UNSPECIFIED TYPE: ICD-10-CM

## 2021-08-02 DIAGNOSIS — J44.1 CHRONIC OBSTRUCTIVE PULMONARY DISEASE WITH ACUTE EXACERBATION: ICD-10-CM

## 2021-08-02 DIAGNOSIS — E66.2 OBESITY HYPOVENTILATION SYNDROME: ICD-10-CM

## 2021-08-02 DIAGNOSIS — E11.9 TYPE 2 DIABETES MELLITUS WITHOUT COMPLICATION, WITHOUT LONG-TERM CURRENT USE OF INSULIN: ICD-10-CM

## 2021-08-02 DIAGNOSIS — E66.01 MORBID OBESITY WITH BODY MASS INDEX (BMI) OF 50.0 TO 59.9 IN ADULT: ICD-10-CM

## 2021-08-02 LAB
ALBUMIN SERPL BCP-MCNC: 3.7 G/DL (ref 3.5–5.2)
ALBUMIN SERPL BCP-MCNC: 3.7 G/DL (ref 3.5–5.2)
ALP SERPL-CCNC: 85 U/L (ref 55–135)
ALP SERPL-CCNC: 85 U/L (ref 55–135)
ALT SERPL W/O P-5'-P-CCNC: 28 U/L (ref 10–44)
ALT SERPL W/O P-5'-P-CCNC: 28 U/L (ref 10–44)
ANION GAP SERPL CALC-SCNC: 11 MMOL/L (ref 8–16)
ANION GAP SERPL CALC-SCNC: 11 MMOL/L (ref 8–16)
AST SERPL-CCNC: 22 U/L (ref 10–40)
AST SERPL-CCNC: 22 U/L (ref 10–40)
BASOPHILS # BLD AUTO: 0.04 K/UL (ref 0–0.2)
BASOPHILS NFR BLD: 0.4 % (ref 0–1.9)
BILIRUB DIRECT SERPL-MCNC: 0.3 MG/DL (ref 0.1–0.3)
BILIRUB SERPL-MCNC: 0.7 MG/DL (ref 0.1–1)
BILIRUB SERPL-MCNC: 0.7 MG/DL (ref 0.1–1)
BUN SERPL-MCNC: 13 MG/DL (ref 6–20)
BUN SERPL-MCNC: 13 MG/DL (ref 6–20)
CALCIUM SERPL-MCNC: 9.9 MG/DL (ref 8.7–10.5)
CALCIUM SERPL-MCNC: 9.9 MG/DL (ref 8.7–10.5)
CHLORIDE SERPL-SCNC: 101 MMOL/L (ref 95–110)
CHLORIDE SERPL-SCNC: 101 MMOL/L (ref 95–110)
CHOLEST SERPL-MCNC: 85 MG/DL (ref 120–199)
CHOLEST SERPL-MCNC: 85 MG/DL (ref 120–199)
CHOLEST/HDLC SERPL: 4 {RATIO} (ref 2–5)
CHOLEST/HDLC SERPL: 4 {RATIO} (ref 2–5)
CO2 SERPL-SCNC: 27 MMOL/L (ref 23–29)
CO2 SERPL-SCNC: 27 MMOL/L (ref 23–29)
CREAT SERPL-MCNC: 0.7 MG/DL (ref 0.5–1.4)
CREAT SERPL-MCNC: 0.7 MG/DL (ref 0.5–1.4)
DIFFERENTIAL METHOD: ABNORMAL
EOSINOPHIL # BLD AUTO: 0.1 K/UL (ref 0–0.5)
EOSINOPHIL NFR BLD: 1.1 % (ref 0–8)
ERYTHROCYTE [DISTWIDTH] IN BLOOD BY AUTOMATED COUNT: 15.1 % (ref 11.5–14.5)
EST. GFR  (AFRICAN AMERICAN): >60 ML/MIN/1.73 M^2
EST. GFR  (AFRICAN AMERICAN): >60 ML/MIN/1.73 M^2
EST. GFR  (NON AFRICAN AMERICAN): >60 ML/MIN/1.73 M^2
EST. GFR  (NON AFRICAN AMERICAN): >60 ML/MIN/1.73 M^2
ESTIMATED AVG GLUCOSE: 108 MG/DL (ref 68–131)
ESTIMATED AVG GLUCOSE: 108 MG/DL (ref 68–131)
GLUCOSE SERPL-MCNC: 101 MG/DL (ref 70–110)
GLUCOSE SERPL-MCNC: 101 MG/DL (ref 70–110)
HBA1C MFR BLD: 5.4 % (ref 4–5.6)
HBA1C MFR BLD: 5.4 % (ref 4–5.6)
HCT VFR BLD AUTO: 40.7 % (ref 40–54)
HDLC SERPL-MCNC: 21 MG/DL (ref 40–75)
HDLC SERPL-MCNC: 21 MG/DL (ref 40–75)
HDLC SERPL: 24.7 % (ref 20–50)
HDLC SERPL: 24.7 % (ref 20–50)
HGB BLD-MCNC: 12.9 G/DL (ref 14–18)
IMM GRANULOCYTES # BLD AUTO: 0.03 K/UL (ref 0–0.04)
IMM GRANULOCYTES NFR BLD AUTO: 0.3 % (ref 0–0.5)
LDLC SERPL CALC-MCNC: 41 MG/DL (ref 63–159)
LDLC SERPL CALC-MCNC: 41 MG/DL (ref 63–159)
LYMPHOCYTES # BLD AUTO: 1.4 K/UL (ref 1–4.8)
LYMPHOCYTES NFR BLD: 14 % (ref 18–48)
MAGNESIUM SERPL-MCNC: 1.8 MG/DL (ref 1.6–2.6)
MCH RBC QN AUTO: 29.1 PG (ref 27–31)
MCHC RBC AUTO-ENTMCNC: 31.7 G/DL (ref 32–36)
MCV RBC AUTO: 92 FL (ref 82–98)
MONOCYTES # BLD AUTO: 0.5 K/UL (ref 0.3–1)
MONOCYTES NFR BLD: 5.2 % (ref 4–15)
NEUTROPHILS # BLD AUTO: 8.2 K/UL (ref 1.8–7.7)
NEUTROPHILS NFR BLD: 79 % (ref 38–73)
NONHDLC SERPL-MCNC: 64 MG/DL
NONHDLC SERPL-MCNC: 64 MG/DL
NRBC BLD-RTO: 0 /100 WBC
PHOSPHATE SERPL-MCNC: 3.2 MG/DL (ref 2.7–4.5)
PLATELET # BLD AUTO: 225 K/UL (ref 150–450)
PMV BLD AUTO: 9.1 FL (ref 9.2–12.9)
POTASSIUM SERPL-SCNC: 4.4 MMOL/L (ref 3.5–5.1)
POTASSIUM SERPL-SCNC: 4.4 MMOL/L (ref 3.5–5.1)
PROT SERPL-MCNC: 7.8 G/DL (ref 6–8.4)
PROT SERPL-MCNC: 7.8 G/DL (ref 6–8.4)
RBC # BLD AUTO: 4.44 M/UL (ref 4.6–6.2)
SODIUM SERPL-SCNC: 139 MMOL/L (ref 136–145)
SODIUM SERPL-SCNC: 139 MMOL/L (ref 136–145)
T4 FREE SERPL-MCNC: 0.9 NG/DL (ref 0.71–1.51)
T4 SERPL-MCNC: 8 UG/DL (ref 4.5–11.5)
TRIGL SERPL-MCNC: 115 MG/DL (ref 30–150)
TRIGL SERPL-MCNC: 115 MG/DL (ref 30–150)
TSH SERPL DL<=0.005 MIU/L-ACNC: 0.77 UIU/ML (ref 0.4–4)
WBC # BLD AUTO: 10.31 K/UL (ref 3.9–12.7)

## 2021-08-02 PROCEDURE — 84100 ASSAY OF PHOSPHORUS: CPT | Performed by: PHYSICIAN ASSISTANT

## 2021-08-02 PROCEDURE — 82746 ASSAY OF FOLIC ACID SERUM: CPT | Performed by: PHYSICIAN ASSISTANT

## 2021-08-02 PROCEDURE — 84436 ASSAY OF TOTAL THYROXINE: CPT | Performed by: PHYSICIAN ASSISTANT

## 2021-08-02 PROCEDURE — 82306 VITAMIN D 25 HYDROXY: CPT | Performed by: PHYSICIAN ASSISTANT

## 2021-08-02 PROCEDURE — 83540 ASSAY OF IRON: CPT | Performed by: PHYSICIAN ASSISTANT

## 2021-08-02 PROCEDURE — 84425 ASSAY OF VITAMIN B-1: CPT | Performed by: PHYSICIAN ASSISTANT

## 2021-08-02 PROCEDURE — 85025 COMPLETE CBC W/AUTO DIFF WBC: CPT | Performed by: PHYSICIAN ASSISTANT

## 2021-08-02 PROCEDURE — 83036 HEMOGLOBIN GLYCOSYLATED A1C: CPT | Performed by: INTERNAL MEDICINE

## 2021-08-02 PROCEDURE — 80061 LIPID PANEL: CPT | Performed by: INTERNAL MEDICINE

## 2021-08-02 PROCEDURE — 82607 VITAMIN B-12: CPT | Performed by: PHYSICIAN ASSISTANT

## 2021-08-02 PROCEDURE — 83735 ASSAY OF MAGNESIUM: CPT | Performed by: PHYSICIAN ASSISTANT

## 2021-08-02 PROCEDURE — 84480 ASSAY TRIIODOTHYRONINE (T3): CPT | Performed by: PHYSICIAN ASSISTANT

## 2021-08-02 PROCEDURE — 80053 COMPREHEN METABOLIC PANEL: CPT | Performed by: INTERNAL MEDICINE

## 2021-08-02 PROCEDURE — 80076 HEPATIC FUNCTION PANEL: CPT | Performed by: PHYSICIAN ASSISTANT

## 2021-08-02 PROCEDURE — 86677 HELICOBACTER PYLORI ANTIBODY: CPT | Performed by: PHYSICIAN ASSISTANT

## 2021-08-02 PROCEDURE — 84443 ASSAY THYROID STIM HORMONE: CPT | Performed by: PHYSICIAN ASSISTANT

## 2021-08-02 PROCEDURE — 84439 ASSAY OF FREE THYROXINE: CPT | Performed by: PHYSICIAN ASSISTANT

## 2021-08-03 ENCOUNTER — PATIENT MESSAGE (OUTPATIENT)
Dept: PULMONOLOGY | Facility: CLINIC | Age: 51
End: 2021-08-03

## 2021-08-03 LAB
25(OH)D3+25(OH)D2 SERPL-MCNC: 23 NG/ML (ref 30–96)
FOLATE SERPL-MCNC: 7.7 NG/ML (ref 4–24)
H PYLORI IGG SERPL QL IA: NEGATIVE
IRON SERPL-MCNC: 52 UG/DL (ref 45–160)
SATURATED IRON: 14 % (ref 20–50)
T3 SERPL-MCNC: 102 NG/DL (ref 60–180)
TOTAL IRON BINDING CAPACITY: 370 UG/DL (ref 250–450)
TRANSFERRIN SERPL-MCNC: 250 MG/DL (ref 200–375)
VIT B12 SERPL-MCNC: 514 PG/ML (ref 210–950)

## 2021-08-04 DIAGNOSIS — R79.89 LOW VITAMIN D LEVEL: Primary | ICD-10-CM

## 2021-08-04 RX ORDER — ERGOCALCIFEROL 1.25 MG/1
50000 CAPSULE ORAL
Qty: 12 CAPSULE | Refills: 0 | Status: SHIPPED | OUTPATIENT
Start: 2021-08-04 | End: 2021-10-21

## 2021-08-06 LAB — VIT B1 BLD-MCNC: 64 UG/L (ref 38–122)

## 2021-08-17 ENCOUNTER — OFFICE VISIT (OUTPATIENT)
Dept: PSYCHIATRY | Facility: CLINIC | Age: 51
End: 2021-08-17
Payer: COMMERCIAL

## 2021-08-17 DIAGNOSIS — Z01.818 PREOPERATIVE EVALUATION TO RULE OUT SURGICAL CONTRAINDICATION: ICD-10-CM

## 2021-08-17 DIAGNOSIS — E66.01 MORBID OBESITY: ICD-10-CM

## 2021-08-17 DIAGNOSIS — G47.33 OBSTRUCTIVE SLEEP APNEA SYNDROME: ICD-10-CM

## 2021-08-17 DIAGNOSIS — E11.9 TYPE 2 DIABETES MELLITUS WITHOUT COMPLICATION, WITHOUT LONG-TERM CURRENT USE OF INSULIN: Primary | ICD-10-CM

## 2021-08-17 PROCEDURE — 90791 PR PSYCHIATRIC DIAGNOSTIC EVALUATION: ICD-10-PCS | Mod: 95,,, | Performed by: PSYCHOLOGIST

## 2021-08-17 PROCEDURE — 3044F HG A1C LEVEL LT 7.0%: CPT | Mod: CPTII,,, | Performed by: PSYCHOLOGIST

## 2021-08-17 PROCEDURE — 90791 PSYCH DIAGNOSTIC EVALUATION: CPT | Mod: 95,,, | Performed by: PSYCHOLOGIST

## 2021-08-17 PROCEDURE — 3044F PR MOST RECENT HEMOGLOBIN A1C LEVEL <7.0%: ICD-10-PCS | Mod: CPTII,,, | Performed by: PSYCHOLOGIST

## 2021-08-27 ENCOUNTER — CLINICAL SUPPORT (OUTPATIENT)
Dept: BARIATRICS | Facility: CLINIC | Age: 51
End: 2021-08-27
Payer: COMMERCIAL

## 2021-08-27 VITALS — BODY MASS INDEX: 51.49 KG/M2 | WEIGHT: 315 LBS

## 2021-08-27 DIAGNOSIS — E66.01 MORBID OBESITY WITH BODY MASS INDEX (BMI) OF 50.0 TO 59.9 IN ADULT: ICD-10-CM

## 2021-08-27 DIAGNOSIS — G47.33 OBSTRUCTIVE SLEEP APNEA SYNDROME: ICD-10-CM

## 2021-08-27 DIAGNOSIS — Z71.3 DIETARY COUNSELING AND SURVEILLANCE: ICD-10-CM

## 2021-08-27 DIAGNOSIS — E11.9 TYPE 2 DIABETES MELLITUS WITHOUT COMPLICATION, WITHOUT LONG-TERM CURRENT USE OF INSULIN: ICD-10-CM

## 2021-08-27 PROCEDURE — 97803 MED NUTRITION INDIV SUBSEQ: CPT | Mod: 95,,, | Performed by: DIETITIAN, REGISTERED

## 2021-08-27 PROCEDURE — 99499 UNLISTED E&M SERVICE: CPT | Mod: 95,,, | Performed by: DIETITIAN, REGISTERED

## 2021-08-27 PROCEDURE — 97803 PR MED NUTR THER, SUBSQ, INDIV, EA 15 MIN: ICD-10-PCS | Mod: 95,,, | Performed by: DIETITIAN, REGISTERED

## 2021-08-27 PROCEDURE — 99499 NO LOS: ICD-10-PCS | Mod: 95,,, | Performed by: DIETITIAN, REGISTERED

## 2021-09-17 ENCOUNTER — TELEPHONE (OUTPATIENT)
Dept: BARIATRICS | Facility: CLINIC | Age: 51
End: 2021-09-17

## 2021-09-22 ENCOUNTER — PATIENT MESSAGE (OUTPATIENT)
Dept: BARIATRICS | Facility: CLINIC | Age: 51
End: 2021-09-22

## 2021-09-27 ENCOUNTER — CLINICAL SUPPORT (OUTPATIENT)
Dept: BARIATRICS | Facility: CLINIC | Age: 51
End: 2021-09-27
Payer: COMMERCIAL

## 2021-09-27 VITALS — WEIGHT: 315 LBS | BODY MASS INDEX: 51.49 KG/M2

## 2021-09-27 DIAGNOSIS — E11.9 TYPE 2 DIABETES MELLITUS WITHOUT COMPLICATION, WITHOUT LONG-TERM CURRENT USE OF INSULIN: ICD-10-CM

## 2021-09-27 DIAGNOSIS — E66.01 MORBID OBESITY WITH BODY MASS INDEX (BMI) OF 50.0 TO 59.9 IN ADULT: ICD-10-CM

## 2021-09-27 DIAGNOSIS — Z71.3 DIETARY COUNSELING AND SURVEILLANCE: ICD-10-CM

## 2021-09-27 DIAGNOSIS — G47.33 OBSTRUCTIVE SLEEP APNEA SYNDROME: ICD-10-CM

## 2021-09-27 PROCEDURE — 97803 PR MED NUTR THER, SUBSQ, INDIV, EA 15 MIN: ICD-10-PCS | Mod: 95,,, | Performed by: DIETITIAN, REGISTERED

## 2021-09-27 PROCEDURE — 97803 MED NUTRITION INDIV SUBSEQ: CPT | Mod: 95,,, | Performed by: DIETITIAN, REGISTERED

## 2021-09-27 PROCEDURE — 99499 UNLISTED E&M SERVICE: CPT | Mod: 95,,, | Performed by: DIETITIAN, REGISTERED

## 2021-09-27 PROCEDURE — 99499 NO LOS: ICD-10-PCS | Mod: 95,,, | Performed by: DIETITIAN, REGISTERED

## 2021-10-19 ENCOUNTER — PATIENT MESSAGE (OUTPATIENT)
Dept: BARIATRICS | Facility: CLINIC | Age: 51
End: 2021-10-19

## 2021-10-19 ENCOUNTER — TELEPHONE (OUTPATIENT)
Dept: BARIATRICS | Facility: CLINIC | Age: 51
End: 2021-10-19

## 2021-10-19 ENCOUNTER — CLINICAL SUPPORT (OUTPATIENT)
Dept: BARIATRICS | Facility: CLINIC | Age: 51
End: 2021-10-19
Payer: COMMERCIAL

## 2021-10-19 DIAGNOSIS — Z71.3 DIETARY COUNSELING AND SURVEILLANCE: ICD-10-CM

## 2021-10-19 DIAGNOSIS — R79.89 LOW VITAMIN D LEVEL: ICD-10-CM

## 2021-10-19 DIAGNOSIS — E66.01 MORBID OBESITY WITH BODY MASS INDEX (BMI) OF 50.0 TO 59.9 IN ADULT: ICD-10-CM

## 2021-10-19 DIAGNOSIS — G47.33 OBSTRUCTIVE SLEEP APNEA SYNDROME: ICD-10-CM

## 2021-10-19 DIAGNOSIS — E66.01 MORBID OBESITY: Primary | ICD-10-CM

## 2021-10-19 DIAGNOSIS — E11.9 TYPE 2 DIABETES MELLITUS WITHOUT COMPLICATION, WITHOUT LONG-TERM CURRENT USE OF INSULIN: ICD-10-CM

## 2021-10-19 PROCEDURE — 99499 UNLISTED E&M SERVICE: CPT | Mod: 95,,, | Performed by: DIETITIAN, REGISTERED

## 2021-10-19 PROCEDURE — 97803 PR MED NUTR THER, SUBSQ, INDIV, EA 15 MIN: ICD-10-PCS | Mod: 95,,, | Performed by: DIETITIAN, REGISTERED

## 2021-10-19 PROCEDURE — 97803 MED NUTRITION INDIV SUBSEQ: CPT | Mod: 95,,, | Performed by: DIETITIAN, REGISTERED

## 2021-10-19 PROCEDURE — 99499 NO LOS: ICD-10-PCS | Mod: 95,,, | Performed by: DIETITIAN, REGISTERED

## 2021-10-21 ENCOUNTER — TELEPHONE (OUTPATIENT)
Dept: BARIATRICS | Facility: CLINIC | Age: 51
End: 2021-10-21

## 2021-10-25 ENCOUNTER — TELEPHONE (OUTPATIENT)
Dept: BARIATRICS | Facility: CLINIC | Age: 51
End: 2021-10-25
Payer: COMMERCIAL

## 2021-10-27 ENCOUNTER — TELEPHONE (OUTPATIENT)
Dept: BARIATRICS | Facility: CLINIC | Age: 51
End: 2021-10-27
Payer: COMMERCIAL

## 2021-11-05 ENCOUNTER — TELEPHONE (OUTPATIENT)
Dept: BARIATRICS | Facility: CLINIC | Age: 51
End: 2021-11-05
Payer: COMMERCIAL

## 2021-11-09 ENCOUNTER — TELEPHONE (OUTPATIENT)
Dept: BARIATRICS | Facility: CLINIC | Age: 51
End: 2021-11-09
Payer: COMMERCIAL

## 2021-12-09 ENCOUNTER — CLINICAL SUPPORT (OUTPATIENT)
Dept: SMOKING CESSATION | Facility: CLINIC | Age: 51
End: 2021-12-09
Payer: COMMERCIAL

## 2021-12-09 VITALS — WEIGHT: 315 LBS | HEIGHT: 74 IN | BODY MASS INDEX: 40.43 KG/M2

## 2021-12-09 DIAGNOSIS — F17.210 EPISODIC CIGARETTE SMOKING DEPENDENCE: Primary | ICD-10-CM

## 2021-12-09 PROCEDURE — 99999 PR PBB SHADOW E&M-EST. PATIENT-LVL II: CPT | Mod: PBBFAC,,,

## 2021-12-09 PROCEDURE — 99404 PR PREVENT COUNSEL,INDIV,60 MIN: ICD-10-PCS | Mod: S$GLB,,,

## 2021-12-09 PROCEDURE — 99404 PREV MED CNSL INDIV APPRX 60: CPT | Mod: S$GLB,,,

## 2021-12-09 PROCEDURE — 99999 PR PBB SHADOW E&M-EST. PATIENT-LVL II: ICD-10-PCS | Mod: PBBFAC,,,

## 2021-12-09 RX ORDER — DIPHENHYDRAMINE HCL 25 MG
4 CAPSULE ORAL
Qty: 190 EACH | Refills: 0 | Status: SHIPPED | OUTPATIENT
Start: 2021-12-09

## 2021-12-09 RX ORDER — IBUPROFEN 200 MG
1 TABLET ORAL DAILY
Qty: 28 PATCH | Refills: 0 | Status: SHIPPED | OUTPATIENT
Start: 2021-12-09

## 2022-01-12 ENCOUNTER — TELEPHONE (OUTPATIENT)
Dept: SMOKING CESSATION | Facility: CLINIC | Age: 52
End: 2022-01-12
Payer: COMMERCIAL

## 2022-01-12 NOTE — TELEPHONE ENCOUNTER
Spoke with patient by telephone today. Patient not interested in Smoking Cessation at this time. Contact information provided.

## 2022-02-04 ENCOUNTER — DOCUMENTATION ONLY (OUTPATIENT)
Dept: BARIATRICS | Facility: CLINIC | Age: 52
End: 2022-02-04
Payer: COMMERCIAL

## 2022-02-15 ENCOUNTER — OFFICE VISIT (OUTPATIENT)
Dept: PAIN MEDICINE | Facility: CLINIC | Age: 52
End: 2022-02-15
Payer: COMMERCIAL

## 2022-02-15 VITALS
WEIGHT: 315 LBS | SYSTOLIC BLOOD PRESSURE: 130 MMHG | HEART RATE: 83 BPM | DIASTOLIC BLOOD PRESSURE: 73 MMHG | BODY MASS INDEX: 40.43 KG/M2 | RESPIRATION RATE: 20 BRPM | HEIGHT: 74 IN | TEMPERATURE: 98 F

## 2022-02-15 DIAGNOSIS — E66.01 MORBID OBESITY WITH BMI OF 50.0-59.9, ADULT: ICD-10-CM

## 2022-02-15 DIAGNOSIS — G89.4 CHRONIC PAIN SYNDROME: ICD-10-CM

## 2022-02-15 DIAGNOSIS — M54.16 LUMBAR RADICULOPATHY: ICD-10-CM

## 2022-02-15 DIAGNOSIS — M48.062 SPINAL STENOSIS OF LUMBAR REGION WITH NEUROGENIC CLAUDICATION: ICD-10-CM

## 2022-02-15 DIAGNOSIS — G89.18 POST-OPERATIVE PAIN: Primary | ICD-10-CM

## 2022-02-15 PROCEDURE — 3008F BODY MASS INDEX DOCD: CPT | Mod: CPTII,S$GLB,, | Performed by: STUDENT IN AN ORGANIZED HEALTH CARE EDUCATION/TRAINING PROGRAM

## 2022-02-15 PROCEDURE — 3075F SYST BP GE 130 - 139MM HG: CPT | Mod: CPTII,S$GLB,, | Performed by: STUDENT IN AN ORGANIZED HEALTH CARE EDUCATION/TRAINING PROGRAM

## 2022-02-15 PROCEDURE — 99205 PR OFFICE/OUTPT VISIT, NEW, LEVL V, 60-74 MIN: ICD-10-PCS | Mod: S$GLB,,, | Performed by: STUDENT IN AN ORGANIZED HEALTH CARE EDUCATION/TRAINING PROGRAM

## 2022-02-15 PROCEDURE — 3078F DIAST BP <80 MM HG: CPT | Mod: CPTII,S$GLB,, | Performed by: STUDENT IN AN ORGANIZED HEALTH CARE EDUCATION/TRAINING PROGRAM

## 2022-02-15 PROCEDURE — 1160F RVW MEDS BY RX/DR IN RCRD: CPT | Mod: CPTII,S$GLB,, | Performed by: STUDENT IN AN ORGANIZED HEALTH CARE EDUCATION/TRAINING PROGRAM

## 2022-02-15 PROCEDURE — 99999 PR PBB SHADOW E&M-EST. PATIENT-LVL IV: ICD-10-PCS | Mod: PBBFAC,,, | Performed by: STUDENT IN AN ORGANIZED HEALTH CARE EDUCATION/TRAINING PROGRAM

## 2022-02-15 PROCEDURE — 1159F PR MEDICATION LIST DOCUMENTED IN MEDICAL RECORD: ICD-10-PCS | Mod: CPTII,S$GLB,, | Performed by: STUDENT IN AN ORGANIZED HEALTH CARE EDUCATION/TRAINING PROGRAM

## 2022-02-15 PROCEDURE — 1160F PR REVIEW ALL MEDS BY PRESCRIBER/CLIN PHARMACIST DOCUMENTED: ICD-10-PCS | Mod: CPTII,S$GLB,, | Performed by: STUDENT IN AN ORGANIZED HEALTH CARE EDUCATION/TRAINING PROGRAM

## 2022-02-15 PROCEDURE — 3075F PR MOST RECENT SYSTOLIC BLOOD PRESS GE 130-139MM HG: ICD-10-PCS | Mod: CPTII,S$GLB,, | Performed by: STUDENT IN AN ORGANIZED HEALTH CARE EDUCATION/TRAINING PROGRAM

## 2022-02-15 PROCEDURE — 3008F PR BODY MASS INDEX (BMI) DOCUMENTED: ICD-10-PCS | Mod: CPTII,S$GLB,, | Performed by: STUDENT IN AN ORGANIZED HEALTH CARE EDUCATION/TRAINING PROGRAM

## 2022-02-15 PROCEDURE — 1159F MED LIST DOCD IN RCRD: CPT | Mod: CPTII,S$GLB,, | Performed by: STUDENT IN AN ORGANIZED HEALTH CARE EDUCATION/TRAINING PROGRAM

## 2022-02-15 PROCEDURE — 3078F PR MOST RECENT DIASTOLIC BLOOD PRESSURE < 80 MM HG: ICD-10-PCS | Mod: CPTII,S$GLB,, | Performed by: STUDENT IN AN ORGANIZED HEALTH CARE EDUCATION/TRAINING PROGRAM

## 2022-02-15 PROCEDURE — 99999 PR PBB SHADOW E&M-EST. PATIENT-LVL IV: CPT | Mod: PBBFAC,,, | Performed by: STUDENT IN AN ORGANIZED HEALTH CARE EDUCATION/TRAINING PROGRAM

## 2022-02-15 PROCEDURE — 99205 OFFICE O/P NEW HI 60 MIN: CPT | Mod: S$GLB,,, | Performed by: STUDENT IN AN ORGANIZED HEALTH CARE EDUCATION/TRAINING PROGRAM

## 2022-02-15 RX ORDER — MELOXICAM 7.5 MG/1
7.5 TABLET ORAL
COMMUNITY
End: 2022-02-15 | Stop reason: SDUPTHER

## 2022-02-15 RX ORDER — GABAPENTIN 800 MG/1
800 TABLET ORAL 3 TIMES DAILY
Qty: 270 TABLET | Refills: 0 | Status: SHIPPED | OUTPATIENT
Start: 2022-02-15 | End: 2023-05-04

## 2022-02-15 RX ORDER — METHOCARBAMOL 750 MG/1
750 TABLET, FILM COATED ORAL 3 TIMES DAILY PRN
COMMUNITY
Start: 2021-12-17 | End: 2022-02-15

## 2022-02-15 RX ORDER — CYCLOBENZAPRINE HCL 10 MG
10 TABLET ORAL 3 TIMES DAILY PRN
Qty: 90 TABLET | Refills: 1 | Status: SHIPPED | OUTPATIENT
Start: 2022-02-15 | End: 2022-04-16

## 2022-02-15 RX ORDER — OXYCODONE AND ACETAMINOPHEN 10; 325 MG/1; MG/1
1 TABLET ORAL 4 TIMES DAILY PRN
Qty: 120 TABLET | Refills: 0 | Status: SHIPPED | OUTPATIENT
Start: 2022-02-15 | End: 2022-03-17

## 2022-02-15 RX ORDER — OXYCODONE AND ACETAMINOPHEN 10; 325 MG/1; MG/1
1 TABLET ORAL 4 TIMES DAILY PRN
COMMUNITY
Start: 2022-01-14 | End: 2022-02-15 | Stop reason: SDUPTHER

## 2022-02-15 RX ORDER — MELOXICAM 7.5 MG/1
7.5 TABLET ORAL 2 TIMES DAILY
Qty: 180 TABLET | Refills: 0 | Status: SHIPPED | OUTPATIENT
Start: 2022-02-15 | End: 2022-05-16

## 2022-02-15 RX ORDER — CYCLOBENZAPRINE HCL 10 MG
10 TABLET ORAL 2 TIMES DAILY PRN
COMMUNITY
Start: 2022-02-14 | End: 2022-02-15 | Stop reason: SDUPTHER

## 2022-02-15 NOTE — PROGRESS NOTES
Chronic Pain - New Consult    Referring Physician: Pauline Childressreferral    Date: 02/15/2022     Re: Antonino Austin  MR#: [id number]  YOB: 1970  Age: 51 y.o.    Chief Complaint:   Chief Complaint   Patient presents with    Low-back Pain         **This note is dictated using the M*Modal Fluency Direct word recognition program. There are word recognition mistakes that are occasionally missed on review.**    ASSESSMENT: 51 y.o. year old male with back and leg pain, consistent with     1. Post-operative pain  meloxicam (MOBIC) 7.5 MG tablet    gabapentin (NEURONTIN) 800 MG tablet    cyclobenzaprine (FLEXERIL) 10 MG tablet    oxyCODONE-acetaminophen (PERCOCET)  mg per tablet   2. Lumbar radiculopathy     3. Spinal stenosis of lumbar region with neurogenic claudication     4. Chronic pain syndrome     5. Morbid obesity with BMI of 50.0-59.9, adult           PLAN:   Post-operative pain  -patient is a medicaid patient and will be seen today to prevent undo hardship to him.  Patient has been placed in an unfortunate situation where he lost his insurance is now no longer able to see his surgeon or pain management physician despite being only 2 months removed from major surgery.  -refill Percocet 10 mg q.i.d..  Patient is now 2 months postop and I stressed the importance of coming off the pain medications.  -discuss that he should not be on Flexeril, Robaxin and tizanidine at the same time.  Told patient he should only be taking 1 of them.  Prefers of Flexeril.  -refill Flexeril 10 mg t.i.d. p.r.n.  -refill meloxicam 7.5 mg b.i.d.  -Refill gabapentin 800 mg t.i.d.  -provided patient with information for [place] pain doctor where he can be seen under medicaid    Spinal stenosis with neurogenic claudication s/p recent surgery  -patient is 2 months removed from surgery which should have improved his spinal stenosis.  Does not appear to be the case at this time.  He has an appointment with Neurosurgery on Wednesday  where they can re-evaluate him.    Morbid obesity  -discussed importance of weight loss.  Patient was not able to follow through with bariatric surgery due to losing his insurance      - patient advised that I cannot see him again because I do not see Medicaid patients.  - Counseled patient regarding the importance of weight loss and activity modification.    The above plan and management options were discussed at length with patient. Patient is in agreement with the above and verbalized understanding. It will be communicated with the referring physician via electronic record, fax, or mail.  Lab/study reports reviewed were important and necessary because subsequent medical and treatment recommendations required review of the above lab/study reports. Images viewed/reviewed above were important and necessary because subsequent medical and treatment recommendations required review of the reviewed image(s).     Electronically signed by:  Gilmar Samuel  02/15/2022    =========================================================================================================    SUBJECTIVE:    Antonino Austin is a 51 y.o. male presents to the clinic for the evaluation of back and leg pain. The pain started September 2020 ago following no inciting event and symptoms have been worsening.  Patient was previously seen spine surgery at our [facility] and pain management in [place].  He is status post a T8-L1 laminectomy on 12/08/2021 for spinal stenosis.  However, after completing his surgery the patient lost his insurance and has to go on Medicaid.  The patient states that after going on Medicaid neither his pain management physician or his surgeon would see him again and he has been left to his own devices.  The patient has a longstanding history back and leg pain.  He had this most recent surgery for his stenosis and his back but has not noticed any significant improvement since surgery.  He does have an  appointment with Neurosurgery at [facility] on Wednesday.     Patient states he was also getting worked up for gastric bypass, however, when he lost his insurance that all fell apart as well.    Patient reports that his pain starts in his back goes down both bilateral legs throughout the entire leg and also into his perineum buttocks.  He has numbness throughout worse on the left side.  It a fall last week due to weakness.    Pain Description:    The pain is located in the bilateral back and legs area and radiates to the entire bilateral legs.    At BEST  7/10   At WORST  10/10 on the WORST day.    On average pain is rated as 9/10.   Today the pain is rated as 10/10  The pain is continuous.  The pain is described as aching, numbing, sharp, shooting and tingling    Symptoms interfere with daily activity, sleeping and work.   Exacerbating factors: Sitting, Standing, Laying, Bending, Touching, Walking, Night Time, Morning, Flexing and Lifting.    Mitigating factors nothing.     Physical Therapy/Home Exercise: No, not currently in physical therapy or home exercise program    Current Pain Medications:    - Opioids: Percocet 10mg QID PRN  - NSAIDs: meloxicam 7.5mg BID  - Anti-Depressants: none  - Anti-Convulsants: gabapentin 800mg TID  - Muscle Relaxers: Flexeril 10mg BID, Robaxin 750mg TID, Tizandine PRN  - Topicals: none  - Others: none    Pain Treatment Therapies:    Spinal decompression: several back surgeries. Most recent T8-L1 Laminectomy    Patient reports loss of sensations. (-) bowel/bladder incontinecne  Patient denies any suicidal or homicidal ideations     report:  Reviewed and consistent with medication use as prescribed.    Imaging:   MRI Lumbar spine 05/2021:    L5-S1 posterior instrumented lumbar fusion with decompressive laminectomy, as above.     Lumbar spondylosis at T12-L1, resulting in moderate/severe spinal canal stenosis.  Subtle increased cord signal may be present, suggesting myelomalacia and/or  edema.     Additional lumbar spondylosis noted, most prominent L4-5, resulting in moderate bilateral neural foraminal narrowing.    Past Medical History:   Diagnosis Date    Allergy     Anxiety     Asthma     Back pain     COPD with acute exacerbation     Depression     Diabetes mellitus     Neuromuscular disorder      Past Surgical History:   Procedure Laterality Date    BACK SURGERY      L5-S1 fusion    HIP REPLACEMENT ARTHROPLASTY      I&D scrotal abcess      JOINT REPLACEMENT      SPINE SURGERY       Social History     Socioeconomic History    Marital status:    Tobacco Use    Smoking status: Current Every Day Smoker     Packs/day: 1.00     Years: 34.00     Pack years: 34.00     Types: Cigarettes     Start date: 1987    Smokeless tobacco: Never Used    Tobacco comment: Pt enrolled in the [employer] on 5/11/13 (SCT Member ID # [id number]). Ambulatory referral to Smoking Cessation    Substance and Sexual Activity    Alcohol use: Yes     Comment: socially    Drug use: No    Sexual activity: Yes     Partners: Female     Social Determinants of Health     Financial Resource Strain: High Risk    Difficulty of Paying Living Expenses: Very hard   Food Insecurity: Food Insecurity Present    Worried About Running Out of Food in the Last Year: Often true    Ran Out of Food in the Last Year: Never true   Transportation Needs: Unknown    Lack of Transportation (Medical): No   Physical Activity: Inactive    Days of Exercise per Week: 0 days    Minutes of Exercise per Session: 0 min   Stress: Stress Concern Present    Feeling of Stress : Very much   Social Connections: Socially Isolated    Frequency of Communication with Friends and Family: Never    Frequency of Social Gatherings with Friends and Family: Never    Attends Quaker Services: Never    Active Member of Clubs or Organizations: No    Marital Status:      Family History   Problem Relation Age of Onset    Dementia Father      Parkinsonism Father     No Known Problems Mother     No Known Problems Brother     Psoriasis Son     No Known Problems Brother     No Known Problems Brother        Review of patient's allergies indicates:   Allergen Reactions    Keflex [cephalexin] Hives    Norco [hydrocodone-acetaminophen]        Current Outpatient Medications   Medication Sig    atorvastatin (LIPITOR) 20 MG tablet Take 1 tablet (20 mg total) by mouth once daily.    nicotine (NICODERM CQ) 21 mg/24 hr Place 1 patch onto the skin once daily.    nicotine polacrilex (NICORETTE) 4 MG Gum Take 1 each (4 mg total) by mouth as needed (as needed). Dispence FRUIT flavor please. Can take 1-2 per hour in place of a cigarette.    traZODone (DESYREL) 50 MG tablet Take 2 tablets (100 mg total) by mouth every evening.    blood sugar diagnostic Strp use once daily (Patient not taking: Reported on 2/15/2022)    blood-glucose meter (ACCU-CHEK CAROL PLUS METER) Misc 1 Device by Misc.(Non-Drug; Combo Route) route once daily. (Patient not taking: Reported on 2/15/2022)    buPROPion (WELLBUTRIN SR) 150 MG TBSR 12 hr tablet TAKE 1 TABLET BY MOUTH TWICE DAILY (Patient not taking: Reported on 2/15/2022)    cyclobenzaprine (FLEXERIL) 10 MG tablet Take 1 tablet (10 mg total) by mouth 3 (three) times daily as needed for Muscle spasms.    gabapentin (NEURONTIN) 800 MG tablet Take 1 tablet (800 mg total) by mouth 3 (three) times daily.    lancets Misc use once daily (Patient not taking: Reported on 2/15/2022)    meloxicam (MOBIC) 7.5 MG tablet Take 1 tablet (7.5 mg total) by mouth 2 (two) times a day.    metFORMIN (GLUCOPHAGE) 1000 MG tablet Take 1 tablet (1,000 mg total) by mouth 2 (two) times daily with meals. (Patient not taking: Reported on 2/15/2022)    oxyCODONE-acetaminophen (PERCOCET)  mg per tablet Take 1 tablet by mouth 4 (four) times daily as needed for Pain.    pyridoxine, vitamin B6, (B-6) 50 MG Tab Take 50 mg by mouth once daily.     "semaglutide (OZEMPIC) 0.25 mg or 0.5 mg(2 mg/1.5 mL) pen injector Inject 0.5 mg into the skin every 7 days. Start with 0.25 mg weekly for 4 weeks and then increase to 0.5 mg if you are tolerating this dose (Patient not taking: Reported on 2/15/2022)     No current facility-administered medications for this visit.       REVIEW OF SYSTEMS:    GENERAL:  No weight loss, malaise or fevers.  HEENT:   No recent changes in vision or hearing  NECK:  Negative for lumps, no difficulty with swallowing.  RESPIRATORY:  Negative for cough, wheezing or shortness of breath, patient denies any recent URI. (+) SOB, COPD  CARDIOVASCULAR:  Negative for chest pain, leg swelling or palpitations.  GI:  Negative for abdominal discomfort, blood in stools or black stools or change in bowel habits.  MUSCULOSKELETAL:  See HPI.  SKIN:  Negative for lesions, rash, and itching.  PSYCH:  No mood disorder or recent psychosocial stressors.  Patients sleep is not disturbed secondary to pain.  HEMATOLOGY/LYMPHOLOGY:  Negative for prolonged bleeding, bruising easily or swollen nodes.  Patient is not currently taking any anti-coagulants  NEURO:   No history of headaches, syncope, paralysis, seizures or tremors. (+) memory loss, dizziness, and some neck, anxiety, depression, loss of of balance  All other reviewed and negative other than HPI.  Balance    OBJECTIVE:    /73   Pulse 83   Temp 98.3 °F (36.8 °C) (Oral)   Resp 20   Ht 6' 2" (1.88 m)   Wt (!) 186.9 kg (412 lb)   BMI 52.90 kg/m²     PHYSICAL EXAMINATION:    GENERAL: Well appearing, in no acute distress, alert and oriented x3.  Morbidly obese.  Becomes winded with slight exertion.  PSYCH:  Mood and affect appropriate.  SKIN: Skin color, texture, turgor normal, no rashes or lesions.  HEAD/FACE:  Normocephalic, atraumatic. Cranial nerves grossly intact.  CV: RRR with palpation of the radial artery.  PULM: CTAB. No evidence of respiratory difficulty, symmetric chest rise.  GI:  Soft and " non-tender.    BACK:   - No obvious deformity or signs of trauma, Normal lumbar lordotic curve  - back incision healing well.  - Positive spinous process tenderness  - Positive paravertebral tenderness  - Positive pain to palpation over the facet joints of the lumbar spine.  His  - Negative QL / Iliac crest / Glut tenderness  - Slump test is Positive for radicular pain  - Slump test is Positive for back pain  - Lumbar ROM is diminished in Flexion with pain  - Lumbar ROM is diminished in Extension with pain    MUSKULOSKELETAL:    Mild edema in b/l LE    MUSCULOSKELETAL: No atrophy or tone abnormalities are noted.    NEURO: Bilateral upper and lower extremity coordination and muscle stretch reflexes are physiologic and symmetric.  Plantar response are downgoing. No clonus.  No loss of sensation is noted.    NEUROLOGICAL EXAM:  MENTAL STATUS: A x O x 3, good concentration, speech is fluent and goal directed  MEMORY: recent and remote are intact  CN: CN2-12 grossly intact  MOTOR: 5/5 in all muscle groups  DTRs: 2+ intact symmetric b/l patella    GAIT: normal.

## 2022-02-16 ENCOUNTER — HOSPITAL ENCOUNTER (OUTPATIENT)
Dept: RADIOLOGY | Facility: HOSPITAL | Age: 52
Discharge: HOME | End: 2022-02-16
Attending: NURSE PRACTITIONER
Payer: COMMERCIAL

## 2022-02-16 ENCOUNTER — OFFICE VISIT (OUTPATIENT)
Dept: NEUROSURGERY | Facility: CLINIC | Age: 52
End: 2022-02-16
Payer: COMMERCIAL

## 2022-02-16 VITALS — HEART RATE: 79 BPM | SYSTOLIC BLOOD PRESSURE: 158 MMHG | DIASTOLIC BLOOD PRESSURE: 88 MMHG

## 2022-02-16 DIAGNOSIS — M54.9 DORSALGIA, UNSPECIFIED: ICD-10-CM

## 2022-02-16 DIAGNOSIS — G89.18 POSTOPERATIVE PAIN: ICD-10-CM

## 2022-02-16 DIAGNOSIS — M54.9 DORSALGIA, UNSPECIFIED: Primary | ICD-10-CM

## 2022-02-16 DIAGNOSIS — M54.16 LUMBAR RADICULOPATHY: ICD-10-CM

## 2022-02-16 DIAGNOSIS — Z98.890 HISTORY OF LUMBAR LAMINECTOMY FOR SPINAL CORD DECOMPRESSION: ICD-10-CM

## 2022-02-16 PROCEDURE — 99204 PR OFFICE/OUTPT VISIT, NEW, LEVL IV, 45-59 MIN: ICD-10-PCS | Mod: S$GLB,,, | Performed by: NURSE PRACTITIONER

## 2022-02-16 PROCEDURE — 99999 PR PBB SHADOW E&M-EST. PATIENT-LVL IV: CPT | Mod: PBBFAC,,, | Performed by: NURSE PRACTITIONER

## 2022-02-16 PROCEDURE — 1159F MED LIST DOCD IN RCRD: CPT | Mod: CPTII,S$GLB,, | Performed by: NURSE PRACTITIONER

## 2022-02-16 PROCEDURE — 3077F SYST BP >= 140 MM HG: CPT | Mod: CPTII,S$GLB,, | Performed by: NURSE PRACTITIONER

## 2022-02-16 PROCEDURE — 1160F PR REVIEW ALL MEDS BY PRESCRIBER/CLIN PHARMACIST DOCUMENTED: ICD-10-PCS | Mod: CPTII,S$GLB,, | Performed by: NURSE PRACTITIONER

## 2022-02-16 PROCEDURE — 72114 XR LUMBAR SPINE 5 VIEW WITH FLEX AND EXT: ICD-10-PCS | Mod: 26,,, | Performed by: RADIOLOGY

## 2022-02-16 PROCEDURE — 72082 XR SCOLIOSIS COMPLETE: ICD-10-PCS | Mod: 26,,, | Performed by: RADIOLOGY

## 2022-02-16 PROCEDURE — 3077F PR MOST RECENT SYSTOLIC BLOOD PRESSURE >= 140 MM HG: ICD-10-PCS | Mod: CPTII,S$GLB,, | Performed by: NURSE PRACTITIONER

## 2022-02-16 PROCEDURE — 99999 PR PBB SHADOW E&M-EST. PATIENT-LVL IV: ICD-10-PCS | Mod: PBBFAC,,, | Performed by: NURSE PRACTITIONER

## 2022-02-16 PROCEDURE — 72082 X-RAY EXAM ENTIRE SPI 2/3 VW: CPT | Mod: TC

## 2022-02-16 PROCEDURE — 72114 X-RAY EXAM L-S SPINE BENDING: CPT | Mod: 26,,, | Performed by: RADIOLOGY

## 2022-02-16 PROCEDURE — 72082 X-RAY EXAM ENTIRE SPI 2/3 VW: CPT | Mod: 26,,, | Performed by: RADIOLOGY

## 2022-02-16 PROCEDURE — 99204 OFFICE O/P NEW MOD 45 MIN: CPT | Mod: S$GLB,,, | Performed by: NURSE PRACTITIONER

## 2022-02-16 PROCEDURE — 1160F RVW MEDS BY RX/DR IN RCRD: CPT | Mod: CPTII,S$GLB,, | Performed by: NURSE PRACTITIONER

## 2022-02-16 PROCEDURE — 3079F PR MOST RECENT DIASTOLIC BLOOD PRESSURE 80-89 MM HG: ICD-10-PCS | Mod: CPTII,S$GLB,, | Performed by: NURSE PRACTITIONER

## 2022-02-16 PROCEDURE — 1159F PR MEDICATION LIST DOCUMENTED IN MEDICAL RECORD: ICD-10-PCS | Mod: CPTII,S$GLB,, | Performed by: NURSE PRACTITIONER

## 2022-02-16 PROCEDURE — 3079F DIAST BP 80-89 MM HG: CPT | Mod: CPTII,S$GLB,, | Performed by: NURSE PRACTITIONER

## 2022-02-16 PROCEDURE — 72114 X-RAY EXAM L-S SPINE BENDING: CPT | Mod: 59,TC

## 2022-02-17 ENCOUNTER — CLINICAL SUPPORT (OUTPATIENT)
Dept: SMOKING CESSATION | Facility: CLINIC | Age: 52
End: 2022-02-17
Payer: COMMERCIAL

## 2022-02-17 DIAGNOSIS — F17.200 NICOTINE DEPENDENCE: Primary | ICD-10-CM

## 2022-02-17 PROCEDURE — 99407 PR TOBACCO USE CESSATION INTENSIVE >10 MINUTES: ICD-10-PCS | Mod: S$GLB,,,

## 2022-02-17 PROCEDURE — 99407 BEHAV CHNG SMOKING > 10 MIN: CPT | Mod: S$GLB,,,

## 2022-02-17 PROCEDURE — 99999 PR PBB SHADOW E&M-EST. PATIENT-LVL I: ICD-10-PCS | Mod: PBBFAC,,,

## 2022-02-17 PROCEDURE — 99999 PR PBB SHADOW E&M-EST. PATIENT-LVL I: CPT | Mod: PBBFAC,,,

## 2022-02-17 NOTE — PROGRESS NOTES
Called pt to f/u on his 3 and 6 month smoking cessation quit status. Pt stated he is still smoking.Pt did come back for quit #2 on 12/9/22, but mother in law passed away recently and he is not ready to continue with quit journey right now. He will call back when ready. Informed him of benefit period, phone follow ups, and contact information. Will complete smart form and resolve quit #1 episode since pt came back for quit #2. Completed 3 month smart form for quit #2 and will continue to follow up with patient.

## 2022-02-22 NOTE — PROGRESS NOTES
Neurosurgery  History & Physical    SUBJECTIVE:     Chief Complaint: Back  Pain    History of Present Illness: Antonino Austin is a 51 y.o. male s/p T8 - L1 decompressive laminectomy for severe stenosis T12-L1 and cord compression T8-T11 on 12/8/21 with Dr. Ronny Villeda at [facility] in [place]. He is being seen in clinic today with his wife tearful and trying and establish care with a new surgeon as he was left to his own devices due to recent loss of insurance. States that Dr. Villeda informed him that he would no longer continue caring for him as his office does not accept Medicaid.     The patient continues to struggle with debilitating post-operative pain as well as chronic low back pain. Describes the pain as constant and aching down the back radiating into his legs bilaterally L >R. Rates the pain as a 9/10. Aggravating factors include sitting, standing, laying, bending, and walking. Denies alleviating factors. Denies fever, chills, redness, or swelling from the incision. The patient continues to struggle with bowel and bladder urgency with occasional fecal smearing if he is unable to get to the bathroom quickly. This has been stable since prior to surgery. Denies saddle anesthesia or gait instability. Endorses weakness in the legs associated with numbness and tingling.     Review of patient's allergies indicates:   Allergen Reactions    Keflex [cephalexin] Hives    Norco [hydrocodone-acetaminophen]        Current Outpatient Medications   Medication Sig Dispense Refill    atorvastatin (LIPITOR) 20 MG tablet Take 1 tablet (20 mg total) by mouth once daily. 90 tablet 3    blood sugar diagnostic Strp use once daily 50 strip 3    blood-glucose meter (ACCU-CHEK CAROL PLUS METER) Misc 1 Device by Misc.(Non-Drug; Combo Route) route once daily. 1 each 0    buPROPion (WELLBUTRIN SR) 150 MG TBSR 12 hr tablet TAKE 1 TABLET BY MOUTH TWICE DAILY 60 tablet 0    cyclobenzaprine (FLEXERIL) 10 MG  tablet Take 1 tablet (10 mg total) by mouth 3 (three) times daily as needed for Muscle spasms. 90 tablet 1    gabapentin (NEURONTIN) 800 MG tablet Take 1 tablet (800 mg total) by mouth 3 (three) times daily. 270 tablet 0    lancets Misc use once daily 100 each 6    meloxicam (MOBIC) 7.5 MG tablet Take 1 tablet (7.5 mg total) by mouth 2 (two) times a day. 180 tablet 0    metFORMIN (GLUCOPHAGE) 1000 MG tablet Take 1 tablet (1,000 mg total) by mouth 2 (two) times daily with meals. 180 tablet 3    nicotine (NICODERM CQ) 21 mg/24 hr Place 1 patch onto the skin once daily. 28 patch 0    nicotine polacrilex (NICORETTE) 4 MG Gum Take 1 each (4 mg total) by mouth as needed (as needed). Dispence FRUIT flavor please. Can take 1-2 per hour in place of a cigarette. 190 each 0    oxyCODONE-acetaminophen (PERCOCET)  mg per tablet Take 1 tablet by mouth 4 (four) times daily as needed for Pain. 120 tablet 0    pyridoxine, vitamin B6, (B-6) 50 MG Tab Take 50 mg by mouth once daily.      semaglutide (OZEMPIC) 0.25 mg or 0.5 mg(2 mg/1.5 mL) pen injector Inject 0.5 mg into the skin every 7 days. Start with 0.25 mg weekly for 4 weeks and then increase to 0.5 mg if you are tolerating this dose 1 pen 11    traZODone (DESYREL) 50 MG tablet Take 2 tablets (100 mg total) by mouth every evening. 60 tablet 11     No current facility-administered medications for this visit.       Past Medical History:   Diagnosis Date    Allergy     Anxiety     Asthma     Back pain     COPD with acute exacerbation     Depression     Diabetes mellitus     Neuromuscular disorder      Past Surgical History:   Procedure Laterality Date    BACK SURGERY      L5-S1 fusion    HIP REPLACEMENT ARTHROPLASTY      I&D scrotal abcess      JOINT REPLACEMENT      SPINE SURGERY       Family History     Problem Relation (Age of Onset)    Dementia Father    No Known Problems Mother, Brother, Brother, Brother    Parkinsonism Father    Psoriasis Son         Social History     Socioeconomic History    Marital status:    Tobacco Use    Smoking status: Current Every Day Smoker     Packs/day: 1.00     Years: 34.00     Pack years: 34.00     Types: Cigarettes     Start date: 1987    Smokeless tobacco: Never Used    Tobacco comment: Pt enrolled in the [employer] on 5/11/13 ([employer] Member ID # [id number]). Ambulatory referral to Smoking Cessation    Substance and Sexual Activity    Alcohol use: Yes     Comment: socially    Drug use: No    Sexual activity: Yes     Partners: Female     Social Determinants of Health     Financial Resource Strain: High Risk    Difficulty of Paying Living Expenses: Very hard   Food Insecurity: Food Insecurity Present    Worried About Running Out of Food in the Last Year: Often true    Ran Out of Food in the Last Year: Never true   Transportation Needs: Unknown    Lack of Transportation (Medical): No   Physical Activity: Inactive    Days of Exercise per Week: 0 days    Minutes of Exercise per Session: 0 min   Stress: Stress Concern Present    Feeling of Stress : Very much   Social Connections: Socially Isolated    Frequency of Communication with Friends and Family: Never    Frequency of Social Gatherings with Friends and Family: Never    Attends Quaker Services: Never    Active Member of Clubs or Organizations: No    Marital Status:        Review of Systems    OBJECTIVE:     Vital Signs  Pulse: 79  BP: (!) 158/88  Pain Score:   9  There is no height or weight on file to calculate BMI.      Neurosurgery Physical Exam  General: well developed, well nourished, no distress.   Head: normocephalic, atraumatic  Neurologic: Alert and oriented. Thought content appropriate.  GCS: Motor: 6/Verbal: 5/Eyes: 4 GCS Total: 15  Mental Status: Awake, Alert, Oriented x 4  Language: No aphasia  Speech: No dysarthria  Cranial nerves: face symmetric, tongue midline, CN II-XII grossly intact.   Eyes: pupils equal, round, reactive to  light with accomodation, EOMI.   Pulmonary: normal respirations, no signs of respiratory distress  Abdomen: soft, non-distended  Skin: Skin is warm, dry and intact. Surgical incision well-healed  Sensory: intact to light touch throughout  Motor Strength:Moves all extremities spontaneously with good tone.  No abnormal movements seen.     Strength  Deltoids Triceps Biceps Wrist Extension Wrist Flexion Hand    Upper: R 5/5 5/5 5/5 5/5 5/5 5/5    L 5/5 5/5 5/5 5/5 5/5 5/5     HF KE KF DF PF EHL   Lower: R 5/5 5/5 5/5 5/5 5/5 5/5    L 5/5 5/5 5/5 5/5 5/5 5/5     Reflexes:   DTR: 2+ symmetrically throughout.  Alcantara's: Negative.  Clonus: Negative.     Cerebellar:   Finger-to-nose: intact bilaterally   Pronator drift: absent bilaterally  Gait stable, antalgic     Cervical:   ROM: Full with flexion, extension, lateral rotation and ear-to-shoulder bend.   Midline TTP: Negative.     Thoracic:  Midline TTP: Negative.     Lumbar:  Midline TTP: Positive  Straight Leg Test: Positive bilaterally    Other:  SI joint TTP: Negative.  Greater trochanter TTP: Negative.  Tenderness with external/internal hip rotation: Negative.    Diagnostic Results:  There is no new imaging to review for this encounter.     ASSESSMENT/PLAN:   Antonino Austin is a 51 y.o. male s/p T8 - L1 decompressive laminectomy for severe stenosis T12-L1 and cord compression T8-T11 on 12/8/21 with Dr. Ronny Villeda at [facility] in [place]. He was seen in clinic today with his wife tearful and trying and establish care with a new surgeon as he was left to his own devices due to recent loss of insurance. I have ordered x-rays to evaluate for instability. The patient was referred to pain management to continue to manage his post-op pain. After discussing the case further with Dr. Cardona, there is concern for abandonment due to the patient being within the post-op global period. His case will be taken to administration for next steps regarding  legal concerns. I have informed the patient. I have encouraged him to contact the clinic with any questions, concerns, or adverse clinical changes. He verbalized understanding.     FEDE Mendiola  Neurosurgery  [facility] [place].     Note dictated with voice recognition software, please excuse any grammatical errors.

## 2022-03-02 ENCOUNTER — TELEPHONE (OUTPATIENT)
Dept: NEUROSURGERY | Facility: CLINIC | Age: 52
End: 2022-03-02
Payer: COMMERCIAL

## 2022-03-02 NOTE — TELEPHONE ENCOUNTER
"----- Message from Haley Judd sent at 3/2/2022 12:08 PM CST -----  Regarding: Appointment  "Type:  Patient Call Back    Who Called:AVA MIN [[id number]]    What is the reqeust in detail:Pt requesting call back to schedule an appointment. Please advise    Can the clinic reply by [facility]? no    Best Call Back Number:[phone]      Additional Information:            "

## 2022-04-01 ENCOUNTER — TELEPHONE (OUTPATIENT)
Dept: NEUROSURGERY | Facility: CLINIC | Age: 52
End: 2022-04-01
Payer: COMMERCIAL

## 2022-04-01 NOTE — TELEPHONE ENCOUNTER
----- Message from Brook Del Real sent at 4/1/2022  2:06 PM CDT -----  Who Called: Patient    What is the reqeust in detail: Requesting call back to have an update on his abandonment case that was brought up to administration. Patient is on schedule for 04/05/22.    Can the clinic reply by [facility]? Yes    Best Call Back Number: [phone]    Additional Information:

## 2022-04-01 NOTE — TELEPHONE ENCOUNTER
Informed patient that I will give a phone call on Monday in regards to scheduling an appointment.

## 2022-05-02 ENCOUNTER — TELEPHONE (OUTPATIENT)
Dept: NEUROSURGERY | Facility: CLINIC | Age: 52
End: 2022-05-02

## 2022-05-02 NOTE — TELEPHONE ENCOUNTER
----- Message from Tiffany Perez sent at 5/2/2022  2:25 PM CDT -----  Contact: pt  Pt requesting call back         Confirmed patient's contact info below:  Contact Name: Antonino Norman  Phone Number: [phone]

## 2022-05-26 ENCOUNTER — OUTSIDE PLACE OF SERVICE (OUTPATIENT)
Dept: CARDIOLOGY | Facility: CLINIC | Age: 52
End: 2022-05-26
Payer: COMMERCIAL

## 2022-05-26 PROCEDURE — 93010 ELECTROCARDIOGRAM REPORT: CPT | Mod: ,,, | Performed by: INTERNAL MEDICINE

## 2022-05-26 PROCEDURE — 93010 PR ELECTROCARDIOGRAM REPORT: ICD-10-PCS | Mod: ,,, | Performed by: INTERNAL MEDICINE

## 2022-05-27 ENCOUNTER — TELEPHONE (OUTPATIENT)
Dept: NEUROSURGERY | Facility: CLINIC | Age: 52
End: 2022-05-27

## 2022-06-03 ENCOUNTER — OFFICE VISIT (OUTPATIENT)
Dept: NEUROSURGERY | Facility: CLINIC | Age: 52
End: 2022-06-03
Payer: COMMERCIAL

## 2022-06-03 DIAGNOSIS — M54.16 LUMBAR RADICULOPATHY: Primary | ICD-10-CM

## 2022-06-03 DIAGNOSIS — M54.9 DORSALGIA, UNSPECIFIED: ICD-10-CM

## 2022-06-03 DIAGNOSIS — Z98.890 HISTORY OF LUMBAR LAMINECTOMY FOR SPINAL CORD DECOMPRESSION: ICD-10-CM

## 2022-06-03 PROCEDURE — 99214 PR OFFICE/OUTPT VISIT, EST, LEVL IV, 30-39 MIN: ICD-10-PCS | Mod: 95,,, | Performed by: NEUROLOGICAL SURGERY

## 2022-06-03 PROCEDURE — 99214 OFFICE O/P EST MOD 30 MIN: CPT | Mod: 95,,, | Performed by: NEUROLOGICAL SURGERY

## 2022-06-03 NOTE — PROGRESS NOTES
Neurosurgery  Established Patient    SUBJECTIVE:     History of Present Illness:  Patient is here to see me for follow-up after last evaluation patient on 02/16/2022.  This is a 52-year-old male had history of L5-S1 lumbar interbody fusion for a grade 2 spondylolisthesis  as well as a thoracic multilevel laminectomy by Dr. Ronny Villeda  in [place] in December of 2021. Patient continued to have significant postoperative lower back pain with radiculopathy that has not improved if not gotten worse.  He states mainly left leg but also has some right leg.  Appears to be both L5 and S1 distribution.  Patient says he has got associated weakness and numbness.  And a local T walking but denies bowel bladder issues.  He is looking for another opinion.    Review of patient's allergies indicates:   Allergen Reactions    Keflex [cephalexin] Hives    Norco [hydrocodone-acetaminophen]        Current Outpatient Medications   Medication Sig Dispense Refill    apixaban (ELIQUIS) 5 mg Tab Take 1 tablet by mouth 2 (two) times daily.      atorvastatin (LIPITOR) 20 MG tablet Take 1 tablet (20 mg total) by mouth once daily. 90 tablet 3    blood sugar diagnostic Strp use once daily 50 strip 3    blood-glucose meter (ACCU-CHEK CAROL PLUS METER) Misc 1 Device by Misc.(Non-Drug; Combo Route) route once daily. 1 each 0    buPROPion (WELLBUTRIN SR) 150 MG TBSR 12 hr tablet TAKE 1 TABLET BY MOUTH TWICE DAILY (Patient not taking: Reported on 5/31/2022) 60 tablet 0    EScitalopram oxalate (LEXAPRO) 20 MG tablet Take 20 mg by mouth once daily.      furosemide (LASIX) 40 MG tablet Take 1 tablet by mouth once daily.      gabapentin (NEURONTIN) 800 MG tablet Take 1 tablet (800 mg total) by mouth 3 (three) times daily. 270 tablet 0    metFORMIN (GLUCOPHAGE) 1000 MG tablet Take 1 tablet (1,000 mg total) by mouth 2 (two) times daily with meals. 180 tablet 3    nicotine (NICODERM CQ) 21 mg/24 hr Place 1 patch onto the skin once daily.  (Patient not taking: Reported on 5/31/2022) 28 patch 0    nicotine polacrilex (NICORETTE) 4 MG Gum Take 1 each (4 mg total) by mouth as needed (as needed). Dispence FRUIT flavor please. Can take 1-2 per hour in place of a cigarette. (Patient not taking: Reported on 5/31/2022) 190 each 0    pyridoxine, vitamin B6, (B-6) 50 MG Tab Take 50 mg by mouth once daily.      semaglutide (OZEMPIC) 0.25 mg or 0.5 mg(2 mg/1.5 mL) pen injector Inject 0.5 mg into the skin every 7 days. Start with 0.25 mg weekly for 4 weeks and then increase to 0.5 mg if you are tolerating this dose 1 pen 11    traZODone (DESYREL) 50 MG tablet Take 2 tablets (100 mg total) by mouth every evening. 60 tablet 11     No current facility-administered medications for this visit.       Past Medical History:   Diagnosis Date    Allergy     Anxiety     Asthma     Back pain     COPD with acute exacerbation     Depression     Diabetes mellitus     Neuromuscular disorder      Past Surgical History:   Procedure Laterality Date    BACK SURGERY      L5-S1 fusion    HIP REPLACEMENT ARTHROPLASTY      I&D scrotal abcess      JOINT REPLACEMENT      SPINE SURGERY       Family History     Problem Relation (Age of Onset)    Dementia Father    No Known Problems Mother, Brother, Brother, Brother    Parkinsonism Father    Psoriasis Son        Social History     Socioeconomic History    Marital status:    Tobacco Use    Smoking status: Current Every Day Smoker     Packs/day: 1.00     Years: 34.00     Pack years: 34.00     Types: Cigarettes     Start date: 1987    Smokeless tobacco: Never Used    Tobacco comment: Pt enrolled in the [employer] on 5/11/13 (SCT Member ID # [id number]). Ambulatory referral to Smoking Cessation    Substance and Sexual Activity    Alcohol use: Yes     Comment: socially    Drug use: No    Sexual activity: Yes     Partners: Female       Review of Systems    OBJECTIVE:     Vital Signs     There is no height or weight  on file to calculate BMI.    Neurosurgery Physical Exam      Diagnostic Results:  I reviewed all of his previous imaging.  Including most recent x-rays shows hardware is in good position but not sure he is fused.    ASSESSMENT/PLAN:     Patient with prior L5-S1 lumbar interbody fusion now with worsening symptoms.  At this stage I have added disadvantage coming into his care at this stage but we would need to re-evaluate his lumbar and thoracic spine since there was a history of possible thoracic laminectomy as well.  We will get updated MRI scan and CT scan of the thoracic lumbar spine with flexion-extension x-rays and EMG nerve conduction test figure out if his L5-S1 nerve roots are still impacted.        Note dictated with voice recognition software, please excuse any grammatical errors.

## 2022-06-06 ENCOUNTER — TELEPHONE (OUTPATIENT)
Dept: NEUROSURGERY | Facility: CLINIC | Age: 52
End: 2022-06-06

## 2022-06-06 NOTE — TELEPHONE ENCOUNTER
Spoke to pt and confirmed times and date for imaging and follow up with Dr. Irizarry. Also sent letter in mail.

## 2022-06-30 ENCOUNTER — CLINICAL SUPPORT (OUTPATIENT)
Dept: SMOKING CESSATION | Facility: CLINIC | Age: 52
End: 2022-06-30
Payer: COMMERCIAL

## 2022-06-30 DIAGNOSIS — F17.200 NICOTINE DEPENDENCE: Primary | ICD-10-CM

## 2022-06-30 PROCEDURE — 99407 PR TOBACCO USE CESSATION INTENSIVE >10 MINUTES: ICD-10-PCS | Mod: S$GLB,,,

## 2022-06-30 PROCEDURE — 99407 BEHAV CHNG SMOKING > 10 MIN: CPT | Mod: S$GLB,,,

## 2022-06-30 NOTE — PROGRESS NOTES
Spoke with patient today in regard to smoking cessation progress for 6 month telephone follow up, he states not tobacco free. Patient has scheduled an appointment to return to the program for Quit #3 on 7/19/2022 @ 10:00 am. Informed patient of benefit period, future follow ups, and contact information if any further help or support is needed. Will resolve episode and complete smart form for Quit attempt #2.

## 2022-07-27 ENCOUNTER — TELEPHONE (OUTPATIENT)
Dept: SMOKING CESSATION | Facility: CLINIC | Age: 52
End: 2022-07-27
Payer: MEDICAID

## 2022-07-27 NOTE — TELEPHONE ENCOUNTER
Called patient to give directions to the clinic office. Patient stated he was aware of the location.

## 2022-08-10 ENCOUNTER — TELEPHONE (OUTPATIENT)
Dept: NEUROSURGERY | Facility: CLINIC | Age: 52
End: 2022-08-10
Payer: MEDICAID

## 2022-08-10 NOTE — TELEPHONE ENCOUNTER
Advised he needs to contact his primary care provider regarding disability. We have him scheduled for further testing and follow up 09/13 , but we did not operate on him. If after seeing imaging DR Irizarry determines disability status he address it then

## 2022-08-10 NOTE — TELEPHONE ENCOUNTER
----- Message from Tiffany Perez sent at 8/10/2022 10:26 AM CDT -----  Regarding: letter  Contact: pt  Pt calling to see if Dr Irizarry can write out a letter stating he has a disability, the birthday and last 4 needs to be on the letter  . SNAP benefits     Confirmed patient's contact info below:  Contact Name: Antonino Norman  Phone Number: [phone]

## 2022-08-11 ENCOUNTER — TELEPHONE (OUTPATIENT)
Dept: NEUROLOGY | Facility: CLINIC | Age: 52
End: 2022-08-11
Payer: MEDICAID

## 2022-08-11 NOTE — TELEPHONE ENCOUNTER
Spoke with patient to confirm his weight prior to his scheduled EMG Procedure currently scheduled on Monday August 15th.  Patient is aware that we are unable to do the EMG here at [facility] nor the [facility].  I informed him that I make Dr. Irizarry's nurse aware of this and that she would contact him with further information.

## 2022-08-19 ENCOUNTER — TELEPHONE (OUTPATIENT)
Dept: NEUROSURGERY | Facility: CLINIC | Age: 52
End: 2022-08-19
Payer: MEDICAID

## 2022-09-13 ENCOUNTER — OFFICE VISIT (OUTPATIENT)
Dept: NEUROSURGERY | Facility: CLINIC | Age: 52
End: 2022-09-13
Payer: MEDICAID

## 2022-09-13 ENCOUNTER — HOSPITAL ENCOUNTER (OUTPATIENT)
Dept: RADIOLOGY | Facility: HOSPITAL | Age: 52
Discharge: HOME | End: 2022-09-13
Attending: NEUROLOGICAL SURGERY
Payer: MEDICAID

## 2022-09-13 DIAGNOSIS — Z98.890 HISTORY OF LUMBAR LAMINECTOMY FOR SPINAL CORD DECOMPRESSION: ICD-10-CM

## 2022-09-13 DIAGNOSIS — M54.16 LUMBAR RADICULOPATHY: Primary | ICD-10-CM

## 2022-09-13 DIAGNOSIS — M54.16 LUMBAR RADICULOPATHY: ICD-10-CM

## 2022-09-13 DIAGNOSIS — M54.9 DORSALGIA, UNSPECIFIED: ICD-10-CM

## 2022-09-13 PROCEDURE — 76377 3D RENDER W/INTRP POSTPROCES: CPT | Mod: 26,,, | Performed by: RADIOLOGY

## 2022-09-13 PROCEDURE — 76377 CT 3D RECON WITH INDEPENDENT WS: ICD-10-PCS | Mod: 26,,, | Performed by: RADIOLOGY

## 2022-09-13 PROCEDURE — 72148 MRI LUMBAR SPINE WITHOUT CONTRAST: ICD-10-PCS | Mod: 26,,, | Performed by: RADIOLOGY

## 2022-09-13 PROCEDURE — 72146 MRI THORACIC SPINE WITHOUT CONTRAST: ICD-10-PCS | Mod: 26,,, | Performed by: RADIOLOGY

## 2022-09-13 PROCEDURE — 72146 MRI CHEST SPINE W/O DYE: CPT | Mod: 26,,, | Performed by: RADIOLOGY

## 2022-09-13 PROCEDURE — 1159F MED LIST DOCD IN RCRD: CPT | Mod: CPTII,,, | Performed by: NEUROLOGICAL SURGERY

## 2022-09-13 PROCEDURE — 99999 PR PBB SHADOW E&M-EST. PATIENT-LVL III: ICD-10-PCS | Mod: PBBFAC,,, | Performed by: NEUROLOGICAL SURGERY

## 2022-09-13 PROCEDURE — 72131 CT LUMBAR SPINE W/O DYE: CPT | Mod: TC

## 2022-09-13 PROCEDURE — 72146 MRI CHEST SPINE W/O DYE: CPT | Mod: TC

## 2022-09-13 PROCEDURE — 72128 CT CHEST SPINE W/O DYE: CPT | Mod: 26,,, | Performed by: RADIOLOGY

## 2022-09-13 PROCEDURE — 72131 CT LUMBAR SPINE W/O DYE: CPT | Mod: 26,,, | Performed by: RADIOLOGY

## 2022-09-13 PROCEDURE — 72114 XR LUMBAR SPINE 5 VIEW WITH FLEX AND EXT: ICD-10-PCS | Mod: 26,,, | Performed by: RADIOLOGY

## 2022-09-13 PROCEDURE — 99213 OFFICE O/P EST LOW 20 MIN: CPT | Mod: PBBFAC,25 | Performed by: NEUROLOGICAL SURGERY

## 2022-09-13 PROCEDURE — 72148 MRI LUMBAR SPINE W/O DYE: CPT | Mod: 26,,, | Performed by: RADIOLOGY

## 2022-09-13 PROCEDURE — 1160F RVW MEDS BY RX/DR IN RCRD: CPT | Mod: CPTII,,, | Performed by: NEUROLOGICAL SURGERY

## 2022-09-13 PROCEDURE — 76377 3D RENDER W/INTRP POSTPROCES: CPT | Mod: TC

## 2022-09-13 PROCEDURE — 72131 CT LUMBAR SPINE WITHOUT CONTRAST: ICD-10-PCS | Mod: 26,,, | Performed by: RADIOLOGY

## 2022-09-13 PROCEDURE — 1159F PR MEDICATION LIST DOCUMENTED IN MEDICAL RECORD: ICD-10-PCS | Mod: CPTII,,, | Performed by: NEUROLOGICAL SURGERY

## 2022-09-13 PROCEDURE — 72148 MRI LUMBAR SPINE W/O DYE: CPT | Mod: TC

## 2022-09-13 PROCEDURE — 72114 X-RAY EXAM L-S SPINE BENDING: CPT | Mod: 26,,, | Performed by: RADIOLOGY

## 2022-09-13 PROCEDURE — 99214 OFFICE O/P EST MOD 30 MIN: CPT | Mod: S$PBB,,, | Performed by: NEUROLOGICAL SURGERY

## 2022-09-13 PROCEDURE — 99999 PR PBB SHADOW E&M-EST. PATIENT-LVL III: CPT | Mod: PBBFAC,,, | Performed by: NEUROLOGICAL SURGERY

## 2022-09-13 PROCEDURE — 1160F PR REVIEW ALL MEDS BY PRESCRIBER/CLIN PHARMACIST DOCUMENTED: ICD-10-PCS | Mod: CPTII,,, | Performed by: NEUROLOGICAL SURGERY

## 2022-09-13 PROCEDURE — 72128 CT THORACIC SPINE WITHOUT CONTRAST: ICD-10-PCS | Mod: 26,,, | Performed by: RADIOLOGY

## 2022-09-13 PROCEDURE — 72114 X-RAY EXAM L-S SPINE BENDING: CPT | Mod: TC

## 2022-09-13 PROCEDURE — 72128 CT CHEST SPINE W/O DYE: CPT | Mod: TC

## 2022-09-13 PROCEDURE — 99214 PR OFFICE/OUTPT VISIT, EST, LEVL IV, 30-39 MIN: ICD-10-PCS | Mod: S$PBB,,, | Performed by: NEUROLOGICAL SURGERY

## 2022-09-13 RX ORDER — ARIPIPRAZOLE 5 MG/1
TABLET ORAL
COMMUNITY
Start: 2022-09-08

## 2022-09-13 RX ORDER — METHOCARBAMOL 750 MG/1
TABLET, FILM COATED ORAL
COMMUNITY
Start: 2022-08-13

## 2022-09-13 RX ORDER — METOPROLOL TARTRATE 50 MG/1
50 TABLET ORAL 2 TIMES DAILY
COMMUNITY
Start: 2022-08-31

## 2022-09-13 RX ORDER — MELOXICAM 7.5 MG/1
TABLET ORAL
COMMUNITY
Start: 2022-08-13

## 2022-09-13 RX ORDER — OXYCODONE AND ACETAMINOPHEN 10; 325 MG/1; MG/1
TABLET ORAL
COMMUNITY
Start: 2022-06-23

## 2022-09-13 RX ORDER — DULOXETIN HYDROCHLORIDE 60 MG/1
60 CAPSULE, DELAYED RELEASE ORAL DAILY
COMMUNITY
Start: 2022-09-12

## 2022-09-13 NOTE — PROGRESS NOTES
Patient Name: Antonino Austin  MRN: [id number]      No chief complaint on file.      History of Present Illness:   Antonino Austin is a 52 y.o. male that has a PMH of L5-S1 interbody fusion in 2021. Patient has continued back and left leg pain since the procedure. He has tried injections ([facility] end of 2020) with minimal relief. He was seen by me in June of 2021 at which point we ordered repeat imaging as well as an EMG. The patient was unable to get the EMG. Since that time he complains of worsening lower back and left leg pain. The pain is constant, 8/10 and radiates down to his left foot. He describes some numbness and tingling to his left foot. The pain is worse is standing, sitting or working. No bowel or bladder incontinence.     Review of Systems:  Review of Systems   Constitutional:  Negative for chills and fever.   Respiratory:  Negative for cough.    Cardiovascular:  Negative for chest pain.   Gastrointestinal:         No bowel incontinence   Genitourinary:         No bladder incontinence    Musculoskeletal:  Positive for back pain (lumbar, radiating down into left leg).   Neurological:         Numbness radiating from lower back down to left foot     Past Medical/Surgical History:  Past Medical History:   Diagnosis Date    Allergy     Anxiety     Asthma     Back pain     COPD with acute exacerbation     Depression     Diabetes mellitus     Neuromuscular disorder      Past Surgical History:   Procedure Laterality Date    BACK SURGERY      L5-S1 fusion    HIP REPLACEMENT ARTHROPLASTY      I&D scrotal abcess      JOINT REPLACEMENT      SPINE SURGERY         Family History:  Family History   Problem Relation Age of Onset    Dementia Father     Parkinsonism Father     No Known Problems Mother     No Known Problems Brother     Psoriasis Son     No Known Problems Brother     No Known Problems Brother        Social History:  Social History     Socioeconomic History    Marital status:     Tobacco Use    Smoking status: Every Day     Packs/day: 1.00     Years: 34.00     Pack years: 34.00     Types: Cigarettes     Start date: 1987    Smokeless tobacco: Never    Tobacco comments:     Pt enrolled in the [employer] on 5/11/13 (SCT Member ID # [id number]). Ambulatory referral to Smoking Cessation    Substance and Sexual Activity    Alcohol use: Yes     Comment: socially    Drug use: No    Sexual activity: Yes     Partners: Female       Allergies:  Review of patient's allergies indicates:   Allergen Reactions    Keflex [cephalexin] Hives    Norco [hydrocodone-acetaminophen]        Home Medications:  Prior to Admission medications    Medication Sig Start Date End Date Taking? Authorizing Provider   apixaban (ELIQUIS) 5 mg Tab Take 1 tablet by mouth 2 (two) times daily. 5/30/22  Yes Historical Provider   ARIPiprazole (ABILIFY) 5 MG Tab Take by mouth. 9/8/22  Yes Historical Provider   atorvastatin (LIPITOR) 20 MG tablet Take 1 tablet (20 mg total) by mouth once daily. 7/16/21 9/13/22 Yes Maryjane Sen MD   blood sugar diagnostic Strp use once daily 5/25/21  Yes Rajani Birmingham MD   DULoxetine (CYMBALTA) 60 MG capsule Take 60 mg by mouth once daily. 9/12/22  Yes Historical Provider   furosemide (LASIX) 40 MG tablet Take 1 tablet by mouth once daily. 5/30/22 9/13/22 Yes Historical Provider   gabapentin (NEURONTIN) 800 MG tablet Take 1 tablet (800 mg total) by mouth 3 (three) times daily. 2/15/22 9/13/22 Yes Gilmar Tee DO   meloxicam (MOBIC) 7.5 MG tablet  8/13/22  Yes Historical Provider   metFORMIN (GLUCOPHAGE) 1000 MG tablet Take 1 tablet (1,000 mg total) by mouth 2 (two) times daily with meals. 7/16/21 9/13/22 Yes Maryjane Sen MD   methocarbamoL (ROBAXIN) 750 MG Tab  8/13/22  Yes Historical Provider   metoprolol tartrate (LOPRESSOR) 50 MG tablet Take 50 mg by mouth 2 (two) times daily. 8/31/22  Yes Historical Provider   oxyCODONE-acetaminophen (PERCOCET)  mg per tablet  6/23/22   Yes Historical Provider   traZODone (DESYREL) 50 MG tablet Take 2 tablets (100 mg total) by mouth every evening. 6/18/21 9/13/22 Yes Rajani Birmingham MD   blood-glucose meter (ACCU-CHEK CAROL PLUS METER) Misc 1 Device by Misc.(Non-Drug; Combo Route) route once daily. 5/25/21 5/25/22  Rajani Birmingham MD   buPROPion (WELLBUTRIN SR) 150 MG TBSR 12 hr tablet TAKE 1 TABLET BY MOUTH TWICE DAILY  Patient not taking: Reported on 9/13/2022 11/18/21   Jason Mendiola MD   EScitalopram oxalate (LEXAPRO) 20 MG tablet Take 20 mg by mouth once daily. 5/12/22   Historical Provider   nicotine (NICODERM CQ) 21 mg/24 hr Place 1 patch onto the skin once daily.  Patient not taking: No sig reported 12/9/21   Jason Mendiola MD   nicotine polacrilex (NICORETTE) 4 MG Gum Take 1 each (4 mg total) by mouth as needed (as needed). Dispence FRUIT flavor please. Can take 1-2 per hour in place of a cigarette.  Patient not taking: No sig reported 12/9/21   Jason Mendiola MD   pyridoxine, vitamin B6, (B-6) 50 MG Tab Take 50 mg by mouth once daily.    Historical Provider   semaglutide (OZEMPIC) 0.25 mg or 0.5 mg(2 mg/1.5 mL) pen injector Inject 0.5 mg into the skin every 7 days. Start with 0.25 mg weekly for 4 weeks and then increase to 0.5 mg if you are tolerating this dose  Patient not taking: Reported on 9/13/2022 7/16/21   Maryjane Sen MD        Physical Exam:  Wt Readings from Last 3 Encounters:   02/15/22 1007 (!) 186.9 kg (412 lb)   12/09/21 1050 (!) 181.9 kg (401 lb)   09/27/21 1050 (!) 181.9 kg (401 lb)     There is no height or weight on file to calculate BMI.    Vital Signs (Most Recent)         Physical Exam  Vitals and nursing note reviewed.   Constitutional:       General: He is not in acute distress.     Appearance: He is obese. He is not ill-appearing or toxic-appearing.   HENT:      Head: Normocephalic and atraumatic.      Nose: Nose normal.   Eyes:      Conjunctiva/sclera: Conjunctivae normal.   Cardiovascular:      Rate  and Rhythm: Normal rate.   Pulmonary:      Effort: Pulmonary effort is normal.   Abdominal:      General: Abdomen is flat.      Palpations: Abdomen is soft.   Musculoskeletal:         General: Tenderness (bilater lumbar paraspinal muscle tenderness to palpation) present.      Cervical back: Normal range of motion.   Skin:     General: Skin is warm and dry.      Capillary Refill: Capillary refill takes less than 2 seconds.   Neurological:      Mental Status: He is alert and oriented to person, place, and time.      Comments: Strength:   5/5 hip flexors bilaterally  5/5 quadriceps bilaterally  3/5 left extensor hallicus longus, 3/5 dorsiflexion left foot, 3/5 plantarflexion left foot   Right foot 5/5 EHL, plantarflexion, dorsiflexion    Sensation:  Decreased sensation to the left lateral thigh, dorsal aspect of left foot    Reflexes:  Limited exam secondary to body habitus       Assessment & Plan:  52 y.o. M seen previously by me in June of this year here for continued L5-S1 radiculopathy s/p 5-1 interbody fusion at outside facility in 2021. Patient has had continued pain in his lower back and left leg. He was unable to complete an EMG study. His last injection was at the end of 2020. Plan will for a selective left sided nerve root block at L5-S1 and to order a myelogram and to follow up.

## 2022-09-14 ENCOUNTER — HOSPITAL ENCOUNTER (EMERGENCY)
Facility: HOSPITAL | Age: 52
Discharge: HOME | End: 2022-09-14
Attending: EMERGENCY MEDICINE
Payer: MEDICAID

## 2022-09-14 VITALS
HEART RATE: 80 BPM | DIASTOLIC BLOOD PRESSURE: 65 MMHG | OXYGEN SATURATION: 91 % | SYSTOLIC BLOOD PRESSURE: 140 MMHG | TEMPERATURE: 102 F | BODY MASS INDEX: 52.9 KG/M2 | WEIGHT: 315 LBS | RESPIRATION RATE: 24 BRPM

## 2022-09-14 DIAGNOSIS — R06.02 SHORTNESS OF BREATH: ICD-10-CM

## 2022-09-14 DIAGNOSIS — R50.9 ACUTE FEBRILE ILLNESS: Primary | ICD-10-CM

## 2022-09-14 LAB
ALBUMIN SERPL BCP-MCNC: 3.4 G/DL (ref 3.5–5.2)
ALP SERPL-CCNC: 96 U/L (ref 38–126)
ALT SERPL W/O P-5'-P-CCNC: 24 U/L (ref 10–44)
ANION GAP SERPL CALC-SCNC: 6 MMOL/L (ref 8–16)
AST SERPL-CCNC: 21 U/L (ref 15–46)
BASOPHILS # BLD AUTO: 0.04 K/UL (ref 0–0.2)
BASOPHILS NFR BLD: 0.3 % (ref 0–1.9)
BILIRUB SERPL-MCNC: 0.6 MG/DL (ref 0.1–1)
CALCIUM SERPL-MCNC: 8.3 MG/DL (ref 8.7–10.5)
CHLORIDE SERPL-SCNC: 96 MMOL/L (ref 95–110)
CO2 SERPL-SCNC: 38 MMOL/L (ref 23–29)
CREAT SERPL-MCNC: 0.53 MG/DL (ref 0.5–1.4)
DIFFERENTIAL METHOD: ABNORMAL
EOSINOPHIL # BLD AUTO: 0.2 K/UL (ref 0–0.5)
EOSINOPHIL NFR BLD: 1.8 % (ref 0–8)
ERYTHROCYTE [DISTWIDTH] IN BLOOD BY AUTOMATED COUNT: 18.5 % (ref 11.5–14.5)
EST. GFR  (NO RACE VARIABLE): >60 ML/MIN/1.73 M^2
GLUCOSE SERPL-MCNC: 111 MG/DL (ref 70–110)
HCT VFR BLD AUTO: 39 % (ref 40–54)
HGB BLD-MCNC: 11.9 G/DL (ref 14–18)
IMM GRANULOCYTES # BLD AUTO: 0.08 K/UL (ref 0–0.04)
IMM GRANULOCYTES NFR BLD AUTO: 0.7 % (ref 0–0.5)
LYMPHOCYTES # BLD AUTO: 1.9 K/UL (ref 1–4.8)
LYMPHOCYTES NFR BLD: 16.4 % (ref 18–48)
MCH RBC QN AUTO: 28.3 PG (ref 27–31)
MCHC RBC AUTO-ENTMCNC: 30.5 G/DL (ref 32–36)
MCV RBC AUTO: 93 FL (ref 82–98)
MONOCYTES # BLD AUTO: 0.6 K/UL (ref 0.3–1)
MONOCYTES NFR BLD: 5.3 % (ref 4–15)
NEUTROPHILS # BLD AUTO: 8.9 K/UL (ref 1.8–7.7)
NEUTROPHILS NFR BLD: 75.5 % (ref 38–73)
NRBC BLD-RTO: 0 /100 WBC
NT-PROBNP SERPL-MCNC: 168 PG/ML (ref 5–900)
PLATELET # BLD AUTO: 255 K/UL (ref 150–450)
PMV BLD AUTO: 9.5 FL (ref 9.2–12.9)
POTASSIUM SERPL-SCNC: 3.9 MMOL/L (ref 3.5–5.1)
PROT SERPL-MCNC: 6.9 G/DL (ref 6–8.4)
RBC # BLD AUTO: 4.2 M/UL (ref 4.6–6.2)
SARS-COV-2 RDRP RESP QL NAA+PROBE: NEGATIVE
SODIUM SERPL-SCNC: 140 MMOL/L (ref 136–145)
TROPONIN I SERPL-MCNC: <0.012 NG/ML (ref 0.01–0.03)
UUN UR-MCNC: 14 MG/DL (ref 2–20)
WBC # BLD AUTO: 11.8 K/UL (ref 3.9–12.7)

## 2022-09-14 PROCEDURE — 93010 ELECTROCARDIOGRAM REPORT: CPT | Mod: ,,, | Performed by: INTERNAL MEDICINE

## 2022-09-14 PROCEDURE — 83880 ASSAY OF NATRIURETIC PEPTIDE: CPT | Mod: ER | Performed by: EMERGENCY MEDICINE

## 2022-09-14 PROCEDURE — 96374 THER/PROPH/DIAG INJ IV PUSH: CPT | Mod: ER

## 2022-09-14 PROCEDURE — 25000003 PHARM REV CODE 250: Mod: ER | Performed by: EMERGENCY MEDICINE

## 2022-09-14 PROCEDURE — 84484 ASSAY OF TROPONIN QUANT: CPT | Mod: ER | Performed by: EMERGENCY MEDICINE

## 2022-09-14 PROCEDURE — 63600175 PHARM REV CODE 636 W HCPCS: Mod: ER | Performed by: EMERGENCY MEDICINE

## 2022-09-14 PROCEDURE — 99900035 HC TECH TIME PER 15 MIN (STAT): Mod: ER

## 2022-09-14 PROCEDURE — 25000242 PHARM REV CODE 250 ALT 637 W/ HCPCS: Mod: ER | Performed by: EMERGENCY MEDICINE

## 2022-09-14 PROCEDURE — 85025 COMPLETE CBC W/AUTO DIFF WBC: CPT | Mod: ER | Performed by: EMERGENCY MEDICINE

## 2022-09-14 PROCEDURE — 93005 ELECTROCARDIOGRAM TRACING: CPT | Mod: ER

## 2022-09-14 PROCEDURE — 99285 EMERGENCY DEPT VISIT HI MDM: CPT | Mod: 25,ER

## 2022-09-14 PROCEDURE — U0002 COVID-19 LAB TEST NON-CDC: HCPCS | Mod: ER | Performed by: EMERGENCY MEDICINE

## 2022-09-14 PROCEDURE — 93010 EKG 12-LEAD: ICD-10-PCS | Mod: ,,, | Performed by: INTERNAL MEDICINE

## 2022-09-14 PROCEDURE — 94760 N-INVAS EAR/PLS OXIMETRY 1: CPT | Mod: ER

## 2022-09-14 PROCEDURE — 80053 COMPREHEN METABOLIC PANEL: CPT | Mod: ER | Performed by: EMERGENCY MEDICINE

## 2022-09-14 PROCEDURE — 94640 AIRWAY INHALATION TREATMENT: CPT | Mod: ER,XB

## 2022-09-14 PROCEDURE — 27000221 HC OXYGEN, UP TO 24 HOURS: Mod: ER

## 2022-09-14 RX ORDER — FUROSEMIDE 10 MG/ML
80 INJECTION INTRAMUSCULAR; INTRAVENOUS
Status: COMPLETED | OUTPATIENT
Start: 2022-09-14 | End: 2022-09-14

## 2022-09-14 RX ORDER — IPRATROPIUM BROMIDE AND ALBUTEROL SULFATE 2.5; .5 MG/3ML; MG/3ML
3 SOLUTION RESPIRATORY (INHALATION)
Status: COMPLETED | OUTPATIENT
Start: 2022-09-14 | End: 2022-09-14

## 2022-09-14 RX ORDER — PREDNISONE 20 MG/1
60 TABLET ORAL
Status: COMPLETED | OUTPATIENT
Start: 2022-09-14 | End: 2022-09-14

## 2022-09-14 RX ORDER — ACETAMINOPHEN 500 MG
1000 TABLET ORAL
Status: COMPLETED | OUTPATIENT
Start: 2022-09-14 | End: 2022-09-14

## 2022-09-14 RX ADMIN — IPRATROPIUM BROMIDE AND ALBUTEROL SULFATE 3 ML: 2.5; .5 SOLUTION RESPIRATORY (INHALATION) at 05:09

## 2022-09-14 RX ADMIN — PREDNISONE 60 MG: 20 TABLET ORAL at 05:09

## 2022-09-14 RX ADMIN — FUROSEMIDE 80 MG: 10 INJECTION, SOLUTION INTRAMUSCULAR; INTRAVENOUS at 05:09

## 2022-09-14 RX ADMIN — ACETAMINOPHEN 1000 MG: 500 TABLET ORAL at 08:09

## 2022-09-14 NOTE — ED PROVIDER NOTES
Encounter Date: 9/14/2022       History     Chief Complaint   Patient presents with    Shortness of Breath     Reports SOB [id number]. States power was shut of and not able to use home O2.      Antonino Austin is a 52 y.o. male who  has a past medical history of Allergy, Anxiety, Asthma, Back pain, COPD with acute exacerbation, Depression, Diabetes mellitus, and Neuromuscular disorder.    The patient presents to the ED due to shortness of breath.  He reports over the past 4 days increased leg swelling.  He however presented to the ED today because he uses approximately 5 L of oxygen and the power company cut off his electricity about 3 hours ago and his shortness of breath acutely worsened after the house got hot.  Other than the leg swelling he reports no new symptoms today including chest pain numbness weakness headache or fever.  No therapies have been tried prior to arrival.  Patient also reports taking Lasix daily he has not taken his evening dose.    Review of patient's allergies indicates:   Allergen Reactions    Keflex [cephalexin] Hives    Norco [hydrocodone-acetaminophen]      Past Medical History:   Diagnosis Date    Allergy     Anxiety     Asthma     Back pain     COPD with acute exacerbation     Depression     Diabetes mellitus     Neuromuscular disorder      Past Surgical History:   Procedure Laterality Date    BACK SURGERY      L5-S1 fusion    HIP REPLACEMENT ARTHROPLASTY      I&D scrotal abcess      JOINT REPLACEMENT      SPINE SURGERY       Family History   Problem Relation Age of Onset    Dementia Father     Parkinsonism Father     No Known Problems Mother     No Known Problems Brother     Psoriasis Son     No Known Problems Brother     No Known Problems Brother      Social History     Tobacco Use    Smoking status: Every Day     Packs/day: 1.00     Years: 34.00     Pack years: 34.00     Types: Cigarettes     Start date: 1987    Smokeless tobacco: Never    Tobacco comments:     Pt enrolled in the  [employer] [facility] on 5/11/13 (SCT Member ID # [id number]). Ambulatory referral to Smoking Cessation    Substance Use Topics    Alcohol use: Yes     Comment: socially    Drug use: No     Review of Systems   Constitutional:  Negative for fever.   HENT:  Negative for sore throat.    Respiratory:  Positive for shortness of breath.    Cardiovascular:  Positive for leg swelling. Negative for chest pain.   Gastrointestinal:  Negative for nausea.   Genitourinary:  Negative for dysuria.   Musculoskeletal:  Negative for back pain.   Skin:  Negative for rash.   Neurological:  Negative for weakness.   Hematological:  Does not bruise/bleed easily.     Physical Exam     Initial Vitals [09/14/22 1654]   BP Pulse Resp Temp SpO2   122/73 73 (!) 26 97.9 °F (36.6 °C) (!) 91 %      MAP       --         Physical Exam    Nursing note and vitals reviewed.  Constitutional: He appears well-nourished. He is not diaphoretic. No distress.   Patient with elevated BMI, occasional pauses while speaking to catch his breath   HENT:   Head: Normocephalic and atraumatic.   Eyes: Conjunctivae are normal.   Neck: No JVD present.   Cardiovascular:  Regular rhythm and intact distal pulses.           Pulmonary/Chest: No respiratory distress. He has wheezes.   Abdominal: There is no abdominal tenderness. There is no rebound and no guarding.   Musculoskeletal:         General: Edema present.     Neurological: He is alert.   Skin: Skin is warm and dry. Capillary refill takes less than 2 seconds. No rash noted.   Psychiatric: He has a normal mood and affect.       ED Course   Procedures  Labs Reviewed   CBC W/ AUTO DIFFERENTIAL   NT-PRO NATRIURETIC PEPTIDE   COMPREHENSIVE METABOLIC PANEL   TROPONIN I          Imaging Results    None          Medications   albuterol-ipratropium 2.5 mg-0.5 mg/3 mL nebulizer solution 3 mL (has no administration in time range)   predniSONE tablet 60 mg (has no administration in time range)   furosemide injection 80 mg (has no  administration in time range)     Medical Decision Making:   Initial Assessment:   52-year-old man with multiple comorbidities presenting today with several days of lower extremity swelling and acutely short of breath after and the power company cut off his electricity.  Patient is maintaining oxygen saturations greater than 93% on his home oxygen he has some diffuse wheezes and signs diminished on auscultation and he has some bilateral pretibial edema that is symmetrical.  Will plan to obtain labs x-ray administer breathing treatment, Lasix which he takes at home and reassess  Differential Diagnosis:   Differential Diagnosis includes, but is not limited to:  PE, MI/ACS, pneumothorax, pericardial effusion/tamonade, pneumonia, lung abscess, pericarditis/myocarditis, pleural effusion, lung mass, CHF exacerbation, asthma exacerbation, COPD exacerbation, aspirated/ingested foreign body, airway obstruction, CO poisoning, anemia, metabolic derangement, allergy/atopy, influenza, viral URI, viral syndrome.    ED Management:  At time of shift change, patient's ED workup incomplete. Oncoming ED physician to continue care. All relevant details were discussed, including pending workup and planned disposition. See summary below.    HPI:  51 yo man with hx COPD, CHF with increased dyspnea after power shut off at home  Pending: labs, ECG, CXR  Disposition: pending reassessment and results                           Clinical Impression:   Final diagnoses:  [R06.02] Shortness of breath               Meek Bradley Jr., MD  09/15/22 4139

## 2022-09-15 NOTE — PROVIDER PROGRESS NOTES - EMERGENCY DEPT.
Encounter Date: 9/14/2022    ED Physician Progress Notes        Physician Note:   COVID 19 test negative. Would like to test for influenza now but the patient is refusing. He feels well and wishes to be discharged immediately.

## 2022-09-15 NOTE — PROVIDER PROGRESS NOTES - EMERGENCY DEPT.
Encounter Date: 9/14/2022    ED Physician Progress Notes        Physician Note:   This patient was signed out to me by Dr. Bradley at shift change.  The patient was pending review of lab results, EKG, and chest x-ray.  He presented with acute shortness of breath.  I reassessed the patient and obtained my own history.  He has COPD and is on supplemental oxygen at all times.  In the hours prior to his presentation to the ED, the [employer] company disconnected his service resulting in his home coming hot.  This made him feel acutely short of breath.  He denies chest pain, dizziness, and syncope.  He had wheezing on his initial examination and was treated with IV Lasix, a nebulized breathing treatment, and oral prednisone.  There are no concerning findings on review of his EKG, chest x-ray, and labs.  He is feeling better and feels that he is at his baseline.  The plan is to discharge him and have him arrange follow-up with his PCP.

## 2022-09-15 NOTE — PROVIDER PROGRESS NOTES - EMERGENCY DEPT.
Encounter Date: 9/14/2022    ED Physician Progress Notes        Physician Note:   The patient was found to be febrile when his discharge vitals were performed.  Will hold discharge, administered Tylenol, and test for COVID 19.

## 2022-10-06 ENCOUNTER — PATIENT MESSAGE (OUTPATIENT)
Dept: BARIATRICS | Facility: CLINIC | Age: 52
End: 2022-10-06
Payer: MEDICAID

## 2022-10-24 ENCOUNTER — TELEPHONE (OUTPATIENT)
Dept: NEUROSURGERY | Facility: CLINIC | Age: 52
End: 2022-10-24
Payer: MEDICAID

## 2022-10-24 NOTE — TELEPHONE ENCOUNTER
I spoke to patient. I told him that Dr. Irizarry hasn't put in orders from their visit yet. I told him that Dr. Irizarry wants him to have an injection, Ct Myelogram, and f/u after those are completed. I told patient that I will put his message on Dr. Irizarry's desk as a reminder and get everything scheduled as soon as the orders are in. Pt understands.  I encouraged him to reach back out with any questions or concerns in the meantime.          ----- Message from Catarino Das MA sent at 10/21/2022  3:35 PM CDT -----  Regarding: FW: PT req return call to discuss surgery  Contact: PT  [phone]    ----- Message -----  From: Ambika Fried  Sent: 10/21/2022   3:17 PM CDT  To: Edie ROSEN Staff  Subject: PT req return call to discuss surgery            PT is reqcall to discuss what happened with scheduling surgery. States had been over 6 weeks since he has heard from anyone in the office   Please reach out to patient at your earliest convenience @# [phone]

## 2022-10-31 ENCOUNTER — TELEPHONE (OUTPATIENT)
Dept: NEUROSURGERY | Facility: CLINIC | Age: 52
End: 2022-10-31
Payer: MEDICAID

## 2022-10-31 NOTE — TELEPHONE ENCOUNTER
I spoke to patient and provided him phone numbers for both myelogram scheduling and pain management. I also requested that he call back or send a message once myelogram is scheduled, so I can get f/u with us scheduled accordingly. Pt understands.

## 2022-11-02 DIAGNOSIS — M54.16 LUMBAR RADICULOPATHY: Primary | ICD-10-CM

## 2022-11-02 DIAGNOSIS — Z98.890 HISTORY OF LUMBAR LAMINECTOMY FOR SPINAL CORD DECOMPRESSION: ICD-10-CM

## 2022-11-09 DIAGNOSIS — Z98.890 HISTORY OF LUMBAR LAMINECTOMY FOR SPINAL CORD DECOMPRESSION: Primary | ICD-10-CM

## 2022-11-09 DIAGNOSIS — M54.16 LUMBAR RADICULOPATHY: ICD-10-CM

## 2022-11-15 ENCOUNTER — TELEPHONE (OUTPATIENT)
Dept: PAIN MEDICINE | Facility: OTHER | Age: 52
End: 2022-11-15
Payer: MEDICAID

## 2022-11-15 NOTE — TELEPHONE ENCOUNTER
Anticoagulant clearance faxed to patients Cardiologist Dr. David Judd for Pain Management procedure. Waiting on response to schedule patient.

## 2022-11-18 ENCOUNTER — PATIENT MESSAGE (OUTPATIENT)
Dept: PAIN MEDICINE | Facility: OTHER | Age: 52
End: 2022-11-18
Payer: MEDICAID

## 2022-11-18 ENCOUNTER — TELEPHONE (OUTPATIENT)
Dept: PAIN MEDICINE | Facility: OTHER | Age: 52
End: 2022-11-18
Payer: MEDICAID

## 2022-11-18 DIAGNOSIS — M54.16 LUMBAR RADICULOPATHY: Primary | ICD-10-CM

## 2022-11-18 NOTE — TELEPHONE ENCOUNTER
Spoke to patient to schedule direct referral procedure. Patient is scheduled on 12/5/22 at 1:00 PM with Dr. Smith. Patient was offered an opportunity to be scheduled in the Pain Management Clinic for a consult with the performing provider before scheduling. Patient declined provider consult. Date, time, and instructions for procedure given to patient. Patient verbalized understanding.

## 2022-11-21 DIAGNOSIS — M54.16 LUMBAR RADICULOPATHY: Primary | ICD-10-CM

## 2022-11-21 DIAGNOSIS — M54.9 DORSALGIA, UNSPECIFIED: ICD-10-CM

## 2022-11-21 DIAGNOSIS — Z98.890 HISTORY OF LUMBAR LAMINECTOMY FOR SPINAL CORD DECOMPRESSION: ICD-10-CM

## 2022-11-22 ENCOUNTER — HOSPITAL ENCOUNTER (OUTPATIENT)
Facility: HOSPITAL | Age: 52
Discharge: HOME | End: 2022-11-22
Attending: NEUROLOGICAL SURGERY | Admitting: NEUROLOGICAL SURGERY
Payer: MEDICAID

## 2022-11-22 VITALS
WEIGHT: 315 LBS | DIASTOLIC BLOOD PRESSURE: 71 MMHG | HEART RATE: 86 BPM | SYSTOLIC BLOOD PRESSURE: 121 MMHG | BODY MASS INDEX: 52.9 KG/M2 | TEMPERATURE: 98 F | RESPIRATION RATE: 22 BRPM

## 2022-11-22 DIAGNOSIS — M54.9 BACK PAIN: ICD-10-CM

## 2022-11-22 LAB — POCT GLUCOSE: 133 MG/DL (ref 70–110)

## 2022-11-22 PROCEDURE — 82962 GLUCOSE BLOOD TEST: CPT

## 2022-11-22 NOTE — PLAN OF CARE
After preop complete, received call from radiologist that myelogram for today is being canceled. Discharge order received. Radiologist spoke with pt at bedside and rescheduled myelogram. Pt discharged via wheelchair with all belongings.

## 2022-11-25 DIAGNOSIS — M54.16 LUMBAR RADICULOPATHY: Primary | ICD-10-CM

## 2022-11-25 DIAGNOSIS — Z98.890 HISTORY OF LUMBAR LAMINECTOMY FOR SPINAL CORD DECOMPRESSION: ICD-10-CM

## 2022-11-25 DIAGNOSIS — M54.9 DORSALGIA, UNSPECIFIED: ICD-10-CM

## 2022-11-29 ENCOUNTER — TELEPHONE (OUTPATIENT)
Dept: PAIN MEDICINE | Facility: CLINIC | Age: 52
End: 2022-11-29
Payer: MEDICAID

## 2022-11-29 NOTE — TELEPHONE ENCOUNTER
----- Message from Charles Smith MD sent at 11/29/2022  2:25 PM CST -----  No 2 weeksapart  ----- Message -----  From: Aida Murrieta  Sent: 11/29/2022   2:18 PM CST  To: Charles Smith MD    Myelogram  ----- Message -----  From: Charles Smith MD  Sent: 11/29/2022   1:43 PM CST  To: Aida Murrieta    What surgery?  ----- Message -----  From: Aida Murrieta  Sent: 11/29/2022  12:13 PM CST  To: Charles Smith MD    Good afternoon Dr. Smith, are you ok with pt. Having procedure 12/5/22 with you  after his surgery 12/1/22, please advise.

## 2022-11-29 NOTE — TELEPHONE ENCOUNTER
----- Message from Luda Faith sent at 11/29/2022 11:24 AM CST -----  Contact: pt @ [phone]  Antonino Austin calling regarding Appointment Access  (message) for #pt is calling to see if he can have procedure on 12/5, and pt has surgery on 12/1. Asking for call back

## 2022-12-01 ENCOUNTER — HOSPITAL ENCOUNTER (OUTPATIENT)
Facility: HOSPITAL | Age: 52
Discharge: HOME | End: 2022-12-01
Attending: NEUROLOGICAL SURGERY | Admitting: NEUROLOGICAL SURGERY
Payer: MEDICAID

## 2022-12-01 ENCOUNTER — HOSPITAL ENCOUNTER (OUTPATIENT)
Dept: RADIOLOGY | Facility: HOSPITAL | Age: 52
Discharge: HOME | End: 2022-12-01
Attending: NEUROLOGICAL SURGERY | Admitting: NEUROLOGICAL SURGERY
Payer: MEDICAID

## 2022-12-01 VITALS
TEMPERATURE: 98 F | OXYGEN SATURATION: 94 % | WEIGHT: 315 LBS | BODY MASS INDEX: 41.75 KG/M2 | HEART RATE: 90 BPM | DIASTOLIC BLOOD PRESSURE: 63 MMHG | RESPIRATION RATE: 20 BRPM | HEIGHT: 73 IN | SYSTOLIC BLOOD PRESSURE: 149 MMHG

## 2022-12-01 DIAGNOSIS — M54.9 BACK PAIN: ICD-10-CM

## 2022-12-01 DIAGNOSIS — Z98.890 HISTORY OF LUMBAR LAMINECTOMY FOR SPINAL CORD DECOMPRESSION: ICD-10-CM

## 2022-12-01 DIAGNOSIS — M54.16 LUMBAR RADICULOPATHY: ICD-10-CM

## 2022-12-01 DIAGNOSIS — M54.9 DORSALGIA, UNSPECIFIED: ICD-10-CM

## 2022-12-01 LAB — POCT GLUCOSE: 107 MG/DL (ref 70–110)

## 2022-12-01 PROCEDURE — 62304 FL MYELOGRAM LUMBAR WITH CT TO FOLLOW: ICD-10-PCS | Mod: ,,, | Performed by: RADIOLOGY

## 2022-12-01 PROCEDURE — 82962 GLUCOSE BLOOD TEST: CPT

## 2022-12-01 PROCEDURE — 25500020 PHARM REV CODE 255: Performed by: NEUROLOGICAL SURGERY

## 2022-12-01 PROCEDURE — 72132 CT MYELOGRAPHY LUMBAR SPINE: ICD-10-PCS | Mod: 26,,, | Performed by: RADIOLOGY

## 2022-12-01 PROCEDURE — 72132 CT LUMBAR SPINE W/DYE: CPT | Mod: TC

## 2022-12-01 PROCEDURE — 72132 CT LUMBAR SPINE W/DYE: CPT | Mod: 26,,, | Performed by: RADIOLOGY

## 2022-12-01 PROCEDURE — 62304 MYELOGRAPHY LUMBAR INJECTION: CPT | Mod: ,,, | Performed by: RADIOLOGY

## 2022-12-01 PROCEDURE — 62304 MYELOGRAPHY LUMBAR INJECTION: CPT

## 2022-12-01 PROCEDURE — 25000003 PHARM REV CODE 250: Performed by: NEUROLOGICAL SURGERY

## 2022-12-01 RX ORDER — LIDOCAINE HYDROCHLORIDE 10 MG/ML
6 INJECTION INFILTRATION; PERINEURAL ONCE
Status: COMPLETED | OUTPATIENT
Start: 2022-12-01 | End: 2022-12-01

## 2022-12-01 RX ORDER — LIDOCAINE HYDROCHLORIDE 10 MG/ML
1 INJECTION, SOLUTION EPIDURAL; INFILTRATION; INTRACAUDAL; PERINEURAL ONCE AS NEEDED
Status: DISCONTINUED | OUTPATIENT
Start: 2022-12-01 | End: 2022-12-01 | Stop reason: HOSPADM

## 2022-12-01 RX ORDER — SODIUM CHLORIDE 9 MG/ML
INJECTION, SOLUTION INTRAVENOUS CONTINUOUS
Status: DISCONTINUED | OUTPATIENT
Start: 2022-12-01 | End: 2022-12-01 | Stop reason: HOSPADM

## 2022-12-01 RX ADMIN — LIDOCAINE HYDROCHLORIDE 6 ML: 10 INJECTION, SOLUTION INFILTRATION; PERINEURAL at 12:12

## 2022-12-01 RX ADMIN — IOHEXOL 10 ML: 300 INJECTION, SOLUTION INTRAVENOUS at 01:12

## 2022-12-01 NOTE — PLAN OF CARE
Patient placed on 2L/min oxygen due to poor oxygenation. Patient has SA diagnosis and uses CPAP at night to sleep.

## 2022-12-01 NOTE — PROCEDURES
Radiology Post-Procedure Note    Pre Op Diagnosis: Back pain     Post Op Diagnosis: Same    Procedure: LP and myelogram    Procedure performed by: Husam Hernández MD    Written Informed Consent Obtained: Yes    Estimated Blood Loss: Minimal    Findings:  1% lidocaine was injected into the skin and a 22-gauge spinal needle was introduced into the L3-L4 interlaminar space under fluoroscopic guidance.  Position of the tip was confirmed to be in the thecal sac and 10 mL Omnipaque 180 was injected under fluoroscopic surveillance.  Postprocedural images demonstrate satisfactory injection of contrast material and dynamic imaging was performed.  The patient tolerated the procedure well and was transferred to CT for further imaging.        Kallie Mojica MD  PGY-II  Diagnostic and Interventional Radiology  [facility]

## 2022-12-01 NOTE — H&P
Diagnostic and Interventional Radiology   History & Physical      Date: 12/1/2022   Primary team: Networked reference to record PCT , Josh Irizarry MD   Room/bed: Room/bed info not found        History of Present Illness:  Antonino Austin is a 52 y.o. male who presents for LP and myelogram due to lower back pain with infection suspected.      Past Medical History:  Past Medical History:   Diagnosis Date    Allergy     Anxiety     Asthma     Back pain     COPD with acute exacerbation     Depression     Diabetes mellitus     Neuromuscular disorder        Past Surgical History:  Past Surgical History:   Procedure Laterality Date    BACK SURGERY      L5-S1 fusion    HIP REPLACEMENT ARTHROPLASTY      I&D scrotal abcess      JOINT REPLACEMENT      SPINE SURGERY          Sedation History:    No known adverse reactions.     Social History:  Social History     Tobacco Use    Smoking status: Every Day     Packs/day: 1.00     Years: 34.00     Pack years: 34.00     Types: Cigarettes     Start date: 1987    Smokeless tobacco: Never    Tobacco comments:     Pt enrolled in the [employer] on 5/11/13 ([employer] Member ID # [id number]). Ambulatory referral to Smoking Cessation    Substance Use Topics    Alcohol use: Yes     Comment: socially    Drug use: No        Home Medications:   Prior to Admission medications    Medication Sig Start Date End Date Taking? Authorizing Provider   apixaban (ELIQUIS) 5 mg Tab Take 1 tablet by mouth 2 (two) times daily. 5/30/22   Historical Provider   ARIPiprazole (ABILIFY) 5 MG Tab Take by mouth. 9/8/22   Historical Provider   atorvastatin (LIPITOR) 20 MG tablet Take 1 tablet (20 mg total) by mouth once daily. 7/16/21 11/22/22  Maryjane Sen MD   blood sugar diagnostic Strp use once daily 5/25/21   Rajani Birmingham MD   blood-glucose meter (ACCU-CHEK CAROL PLUS METER) Misc 1 Device by Misc.(Non-Drug; Combo Route) route once daily. 5/25/21 5/25/22  Rajani Birmingham MD   buPROPion (WELLBUTRIN SR)  150 MG TBSR 12 hr tablet TAKE 1 TABLET BY MOUTH TWICE DAILY  Patient not taking: Reported on 9/13/2022 11/18/21   Jason Mendiola MD   DULoxetine (CYMBALTA) 60 MG capsule Take 60 mg by mouth once daily. 9/12/22   Historical Provider   EScitalopram oxalate (LEXAPRO) 20 MG tablet Take 20 mg by mouth once daily. 5/12/22   Historical Provider   furosemide (LASIX) 40 MG tablet Take 1 tablet by mouth once daily. 5/30/22 12/1/22  Historical Provider   gabapentin (NEURONTIN) 800 MG tablet Take 1 tablet (800 mg total) by mouth 3 (three) times daily. 2/15/22 12/1/22  Gilmar Tee DO   meloxicam (MOBIC) 7.5 MG tablet  8/13/22   Historical Provider   metFORMIN (GLUCOPHAGE) 1000 MG tablet Take 1 tablet (1,000 mg total) by mouth 2 (two) times daily with meals. 7/16/21 12/1/22  Maryjane Sen MD   methocarbamoL (ROBAXIN) 750 MG Tab  8/13/22   Historical Provider   metoprolol tartrate (LOPRESSOR) 50 MG tablet Take 50 mg by mouth 2 (two) times daily. 8/31/22   Historical Provider   nicotine (NICODERM CQ) 21 mg/24 hr Place 1 patch onto the skin once daily.  Patient not taking: No sig reported 12/9/21   Jason Mendiola MD   nicotine polacrilex (NICORETTE) 4 MG Gum Take 1 each (4 mg total) by mouth as needed (as needed). Dispence FRUIT flavor please. Can take 1-2 per hour in place of a cigarette.  Patient not taking: No sig reported 12/9/21   Jason Mendiola MD   oxyCODONE-acetaminophen (PERCOCET)  mg per tablet  6/23/22   Historical Provider   pyridoxine, vitamin B6, (B-6) 50 MG Tab Take 50 mg by mouth once daily.    Historical Provider   semaglutide (OZEMPIC) 0.25 mg or 0.5 mg(2 mg/1.5 mL) pen injector Inject 0.5 mg into the skin every 7 days. Start with 0.25 mg weekly for 4 weeks and then increase to 0.5 mg if you are tolerating this dose  Patient not taking: Reported on 9/13/2022 7/16/21   Maryjane Sen MD   traZODone (DESYREL) 50 MG tablet Take 2 tablets (100 mg total) by mouth every evening. 6/18/21 9/13/22   Rajani Birmingham MD       Inpatient Medications:  No current facility-administered medications for this encounter.  No current outpatient medications on file.    Facility-Administered Medications Ordered in Other Encounters:     0.9%  NaCl infusion, , Intravenous, Continuous, Brandon Matute DO    LIDOcaine (PF) 10 mg/ml (1%) injection 10 mg, 1 mL, Intradermal, Once PRN, Brandon Matute DO         Allergies:   Review of patient's allergies indicates:   Allergen Reactions    Keflex [cephalexin] Hives    Norco [hydrocodone-acetaminophen]        Review of Systems:   As documented in primary provider H&P.    Vital Signs:       Temp:  [97.6 °F (36.4 °C)]   Pulse:  [83]   Resp:  [16]   BP: (121)/(74)   SpO2:  [96 %]      Physical Exam:  no acute distress, laying comfortably in bed, pleasant and cooperative  Breathing unlabored  Abdomen benign  Extremities warm and well perfused      Laboratory:  No results found for: INR    Lab Results   Component Value Date    WBC 11.80 09/14/2022    HGB 11.9 (L) 09/14/2022    HCT 39.0 (L) 09/14/2022    MCV 93 09/14/2022     09/14/2022      Lab Results   Component Value Date     (H) 09/14/2022     09/14/2022    K 3.9 09/14/2022    CL 96 09/14/2022    CO2 38 (H) 09/14/2022    BUN 14 09/14/2022    CREATININE 0.53 09/14/2022    CALCIUM 8.3 (L) 09/14/2022    MG 1.8 08/02/2021    ALT 24 09/14/2022    AST 21 09/14/2022    ALBUMIN 3.4 (L) 09/14/2022    BILITOT 0.6 09/14/2022    BILIDIR 0.3 08/02/2021       Imaging:     Reviewed.      ASSESSMENT/PLAN/RECOMMENDATIONS:       Sedation Plan: none  Patient will undergo: LP and myelogram      Kallie Mojica MD  PGY-II  Diagnostic and Interventional Radiology  [facility]

## 2022-12-05 NOTE — DISCHARGE SUMMARY
Radiology Discharge Summary      Hospital Course: No complications    Admit Date: 12/1/2022  Discharge Date: 12/05/2022     Instructions Given to Patient: Yes  Diet: Resume prior diet  Activity: activity as tolerated    Description of Condition on Discharge: Stable  Vital Signs (Most Recent): Temp: 98.2 °F (36.8 °C) (12/01/22 1445)  Pulse: 90 (12/01/22 1445)  Resp: 20 (12/01/22 1445)  BP: (!) 149/63 (12/01/22 1445)  SpO2: (!) 94 % (12/01/22 1445)    Discharge Disposition: Home    Discharge Diagnosis:     Back pain    Lumbar myelogram     Follow-up:   Per referring provider    Husam Hernández MD  Department of Radiology  Pager: [phone]

## 2022-12-08 ENCOUNTER — PATIENT MESSAGE (OUTPATIENT)
Dept: ADMINISTRATIVE | Facility: OTHER | Age: 52
End: 2022-12-08
Payer: MEDICAID

## 2022-12-15 ENCOUNTER — HOSPITAL ENCOUNTER (OUTPATIENT)
Facility: OTHER | Age: 52
Discharge: HOME | End: 2022-12-15
Attending: ANESTHESIOLOGY | Admitting: ANESTHESIOLOGY
Payer: MEDICAID

## 2022-12-15 VITALS
BODY MASS INDEX: 41.75 KG/M2 | HEIGHT: 73 IN | WEIGHT: 315 LBS | DIASTOLIC BLOOD PRESSURE: 60 MMHG | TEMPERATURE: 98 F | HEART RATE: 92 BPM | OXYGEN SATURATION: 92 % | RESPIRATION RATE: 16 BRPM | SYSTOLIC BLOOD PRESSURE: 131 MMHG

## 2022-12-15 DIAGNOSIS — G89.29 CHRONIC PAIN: ICD-10-CM

## 2022-12-15 DIAGNOSIS — M54.16 LUMBAR RADICULOPATHY: Primary | ICD-10-CM

## 2022-12-15 LAB — POCT GLUCOSE: 120 MG/DL (ref 70–110)

## 2022-12-15 PROCEDURE — 63600175 PHARM REV CODE 636 W HCPCS: Performed by: ANESTHESIOLOGY

## 2022-12-15 PROCEDURE — 25000003 PHARM REV CODE 250: Performed by: ANESTHESIOLOGY

## 2022-12-15 PROCEDURE — 64484 PRA INJECT ANES/STEROID FORAMEN LUMBAR/SACRAL W IMG GUIDE ,EA ADD LEVEL: ICD-10-PCS | Mod: LT,,, | Performed by: ANESTHESIOLOGY

## 2022-12-15 PROCEDURE — 64483 NJX AA&/STRD TFRM EPI L/S 1: CPT | Mod: LT,,, | Performed by: ANESTHESIOLOGY

## 2022-12-15 PROCEDURE — 64483 PR EPIDURAL INJ, ANES/STEROID, TRANSFORAMINAL, LUMB/SACR, SNGL LEVL: ICD-10-PCS | Mod: LT,,, | Performed by: ANESTHESIOLOGY

## 2022-12-15 PROCEDURE — 64484 NJX AA&/STRD TFRM EPI L/S EA: CPT | Mod: LT | Performed by: ANESTHESIOLOGY

## 2022-12-15 PROCEDURE — 25500020 PHARM REV CODE 255: Performed by: ANESTHESIOLOGY

## 2022-12-15 PROCEDURE — 64484 NJX AA&/STRD TFRM EPI L/S EA: CPT | Mod: LT,,, | Performed by: ANESTHESIOLOGY

## 2022-12-15 PROCEDURE — 64483 NJX AA&/STRD TFRM EPI L/S 1: CPT | Mod: LT | Performed by: ANESTHESIOLOGY

## 2022-12-15 RX ORDER — LIDOCAINE HYDROCHLORIDE 20 MG/ML
INJECTION, SOLUTION INFILTRATION; PERINEURAL
Status: DISCONTINUED | OUTPATIENT
Start: 2022-12-15 | End: 2022-12-15 | Stop reason: HOSPADM

## 2022-12-15 RX ORDER — BUPIVACAINE HYDROCHLORIDE 2.5 MG/ML
INJECTION, SOLUTION EPIDURAL; INFILTRATION; INTRACAUDAL
Status: DISCONTINUED | OUTPATIENT
Start: 2022-12-15 | End: 2022-12-15 | Stop reason: HOSPADM

## 2022-12-15 RX ORDER — SODIUM CHLORIDE 9 MG/ML
500 INJECTION, SOLUTION INTRAVENOUS CONTINUOUS
Status: DISCONTINUED | OUTPATIENT
Start: 2022-12-15 | End: 2022-12-15 | Stop reason: HOSPADM

## 2022-12-15 RX ORDER — DEXAMETHASONE SODIUM PHOSPHATE 4 MG/ML
INJECTION, SOLUTION INTRA-ARTICULAR; INTRALESIONAL; INTRAMUSCULAR; INTRAVENOUS; SOFT TISSUE
Status: DISCONTINUED | OUTPATIENT
Start: 2022-12-15 | End: 2022-12-15 | Stop reason: HOSPADM

## 2022-12-15 NOTE — DISCHARGE INSTRUCTIONS

## 2022-12-15 NOTE — DISCHARGE SUMMARY
Discharge Note  Short Stay      SUMMARY     Admit Date: 12/15/2022    Attending Physician: Charles Smith      Discharge Physician: Charles Smith      Discharge Date: 12/15/2022 10:01 AM    Procedure(s) (LRB):  BLOCK, NERVE LEFT L5 AND S1 SELECTIVE NERBE ROOT BLOCK CONTRAST DIRECT REFERRAL TALA (Left)    Final Diagnosis: Lumbar radiculopathy [M54.16]    Disposition: Home or self care    Patient Instructions:   Current Discharge Medication List        CONTINUE these medications which have NOT CHANGED    Details   apixaban (ELIQUIS) 5 mg Tab Take 1 tablet by mouth 2 (two) times daily.      ARIPiprazole (ABILIFY) 5 MG Tab Take by mouth.      atorvastatin (LIPITOR) 20 MG tablet Take 1 tablet (20 mg total) by mouth once daily.  Qty: 90 tablet, Refills: 3    Associated Diagnoses: Type 2 diabetes mellitus without complication, without long-term current use of insulin      blood sugar diagnostic Strp use once daily  Qty: 50 strip, Refills: 3    Comments: package size  Associated Diagnoses: Type 2 diabetes mellitus without complication, without long-term current use of insulin      blood-glucose meter (ACCU-CHEK CAROL PLUS METER) Misc 1 Device by Misc.(Non-Drug; Combo Route) route once daily.  Qty: 1 each, Refills: 0    Associated Diagnoses: Type 2 diabetes mellitus without complication, without long-term current use of insulin      buPROPion (WELLBUTRIN SR) 150 MG TBSR 12 hr tablet TAKE 1 TABLET BY MOUTH TWICE DAILY  Qty: 60 tablet, Refills: 0    Comments: 11/18/2021 8:07:04 AM  Associated Diagnoses: Cigarette nicotine dependence, uncomplicated      DULoxetine (CYMBALTA) 60 MG capsule Take 60 mg by mouth once daily.      EScitalopram oxalate (LEXAPRO) 20 MG tablet Take 20 mg by mouth once daily.      furosemide (LASIX) 40 MG tablet Take 1 tablet by mouth once daily.      gabapentin (NEURONTIN) 800 MG tablet Take 1 tablet (800 mg total) by mouth 3 (three) times daily.  Qty: 270 tablet, Refills: 0    Associated Diagnoses:  Post-operative pain      meloxicam (MOBIC) 7.5 MG tablet       metFORMIN (GLUCOPHAGE) 1000 MG tablet Take 1 tablet (1,000 mg total) by mouth 2 (two) times daily with meals.  Qty: 180 tablet, Refills: 3    Associated Diagnoses: Type 2 diabetes mellitus without complication, without long-term current use of insulin      methocarbamoL (ROBAXIN) 750 MG Tab       metoprolol tartrate (LOPRESSOR) 50 MG tablet Take 50 mg by mouth 2 (two) times daily.      nicotine (NICODERM CQ) 21 mg/24 hr Place 1 patch onto the skin once daily.  Qty: 28 patch, Refills: 0    Comments: Please fill with [facility] ID [id number]   Group [id number] Bin [id number]  Associated Diagnoses: Episodic cigarette smoking dependence      nicotine polacrilex (NICORETTE) 4 MG Gum Take 1 each (4 mg total) by mouth as needed (as needed). Dispence FRUIT flavor please. Can take 1-2 per hour in place of a cigarette.  Qty: 190 each, Refills: 0    Comments: Please fill with [facility] [employer] [facility] ID [id number]   Group [id number] Bin [id number]  Associated Diagnoses: Episodic cigarette smoking dependence      oxyCODONE-acetaminophen (PERCOCET)  mg per tablet       pyridoxine, vitamin B6, (B-6) 50 MG Tab Take 50 mg by mouth once daily.      semaglutide (OZEMPIC) 0.25 mg or 0.5 mg(2 mg/1.5 mL) pen injector Inject 0.5 mg into the skin every 7 days. Start with 0.25 mg weekly for 4 weeks and then increase to 0.5 mg if you are tolerating this dose  Qty: 1 pen, Refills: 11    Associated Diagnoses: Type 2 diabetes mellitus without complication, without long-term current use of insulin      traZODone (DESYREL) 50 MG tablet Take 2 tablets (100 mg total) by mouth every evening.  Qty: 60 tablet, Refills: 11    Associated Diagnoses: Other insomnia                 Discharge Diagnosis: Lumbar radiculopathy [M54.16]  Condition on Discharge: Stable with no complications to procedure   Diet on Discharge: Same as before.  Activity: as per instruction sheet.  Discharge to: Home  with a responsible adult.  Follow up: 2-4 weeks       Please call my office or pager at [phone] if experienced any weakness or loss of sensation, fever > 101.5, pain uncontrolled with oral medications, persistent nausea/vomiting/or diarrhea, redness or drainage from the incisions, or any other worrisome concerns. If physician on call was not reached or could not communicate with our office for any reason please go to the nearest emergency department

## 2022-12-15 NOTE — OP NOTE
Lumbar Selective Nerve Root Steroid Injection under Fluoroscopic Guidance    The procedure, risks, benefits, and options were discussed with the patient. There are no contraindications to the procedure. The patent expressed understanding and agreed to the procedure. Informed written consent was obtained prior to the start of the procedure and can be found in the patient's chart.    PATIENT NAME: Antonino Austin   MRN: [id number]     DATE OF PROCEDURE: 12/15/2022    PROCEDURE:  Left  L5/S1 and S1 Lumbar Selective Nerve Root Steroid Injection under Fluoroscopic Guidance    PRE-OP DIAGNOSIS: Lumbar radiculopathy [M54.16] Lumbar radiculopathy [M54.16]    POST-OP DIAGNOSIS: Same    PHYSICIAN: Charles Smith MD    ASSISTANTS: Efren Berg MD     MEDICATIONS INJECTED: Preservative-free Decadron 10mg with 5cc of Lidocaine 1% MPF     LOCAL ANESTHETIC INJECTED: Xylocaine 2%     SEDATION: None    ESTIMATED BLOOD LOSS: None    COMPLICATIONS: None    TECHNIQUE: Time-out was performed to identify the patient and procedure to be performed. With the patient laying in a prone position, the surgical area was prepped and draped in the usual sterile fashion using ChloraPrep and a fenestrated drape.The levels were determined under fluoroscopy guidance. Skin anesthesia was achieved by injecting Lidocaine 2% over the injection sites. The selective nerve roots were then approached with a 22 gauge, 3.5 inch spinal quinke needle that was introduced under fluoroscopic guidance in the AP and Lateral views. Once the needle tip was in the area of the selective nerve root, and there was no blood, CSF or paraesthesias, contrast dye Omnipaque (300mg/mL) was injected to confirm placement and there was no vascular runoff. Fluoroscopic imaging in the AP and lateral views revealed a clear outline of the spinal nerve with proximal spread of agent through the neural forame. 2 mL of the medication mixture listed above was injected slowly at each site.  Displacement of the radio opaque contrast after injection of the medication confirmed that the medication went into the area of the selective nerve root. The needles were removed and bleeding was nil. A sterile dressing was applied. No specimens collected. The patient tolerated the procedure well.     PAIN BEFORE THE PROCEDURE: 10/10    PAIN AFTER THE PROCEDURE: 7/10    The patient was monitored after the procedure in the recovery area. They were given post-procedure and discharge instructions to follow at home. The patient was discharged in a stable condition.        Charles Smith MD

## 2022-12-15 NOTE — H&P
"HPI  BLOCK, NERVE LEFT L5 AND S1 SELECTIVE NERVE ROOT BLOCK           Past Medical History:   Diagnosis Date    Allergy     Anxiety     Asthma     Back pain     COPD with acute exacerbation     Depression     Diabetes mellitus     Neuromuscular disorder      Past Surgical History:   Procedure Laterality Date    BACK SURGERY      L5-S1 fusion    HIP REPLACEMENT ARTHROPLASTY      I&D scrotal abcess      JOINT REPLACEMENT      MYELOGRAPHY N/A 12/1/2022    Procedure: Myelogram;  Surgeon: Tate Diagnostic Provider;  Location: [facility] OR [address] [place] [address];  Service: Radiology;  Laterality: N/A;  Lumbar    SPINE SURGERY       Review of patient's allergies indicates:   Allergen Reactions    Keflex [cephalexin] Hives    Norco [hydrocodone-acetaminophen]         PMHx, PSHx, Allergies, Medications reviewed in epic      ROS negative except pain complaints in HPI    OBJECTIVE:    BP (!) 149/82 (BP Location: Right arm, Patient Position: Sitting)   Pulse (!) 115   Temp 98.3 °F (36.8 °C) (Oral)   Resp 16   Ht 6' 1" (1.854 m)   Wt (!) 186.9 kg (412 lb)   SpO2 (!) 92%   BMI 54.36 kg/m²     PHYSICAL EXAMINATION:    GENERAL: Well appearing, in no acute distress, alert and oriented x3.  PSYCH:  Mood and affect appropriate.  SKIN: Skin color, texture, turgor normal, no rashes or lesions.  CV: RRR with palpation of the radial artery.  PULM: No evidence of respiratory difficulty, symmetric chest rise. Clear to auscultation.  NEURO: Cranial nerves grossly intact.    Plan:    Proceed with procedure as planned    Franco Leone  12/15/2022    "

## 2022-12-16 ENCOUNTER — CLINICAL SUPPORT (OUTPATIENT)
Dept: SMOKING CESSATION | Facility: CLINIC | Age: 52
End: 2022-12-16
Payer: COMMERCIAL

## 2022-12-16 DIAGNOSIS — F17.200 NICOTINE DEPENDENCE: Primary | ICD-10-CM

## 2022-12-16 PROCEDURE — 99999 PR PBB SHADOW E&M-EST. PATIENT-LVL I: ICD-10-PCS | Mod: PBBFAC,,,

## 2022-12-16 PROCEDURE — 99407 PR TOBACCO USE CESSATION INTENSIVE >10 MINUTES: ICD-10-PCS | Mod: S$GLB,,,

## 2022-12-16 PROCEDURE — 99999 PR PBB SHADOW E&M-EST. PATIENT-LVL I: CPT | Mod: PBBFAC,,,

## 2022-12-16 PROCEDURE — 99407 BEHAV CHNG SMOKING > 10 MIN: CPT | Mod: S$GLB,,,

## 2023-01-03 ENCOUNTER — OFFICE VISIT (OUTPATIENT)
Dept: NEUROSURGERY | Facility: CLINIC | Age: 53
End: 2023-01-03
Payer: MEDICAID

## 2023-01-03 DIAGNOSIS — M54.16 LUMBAR RADICULOPATHY: Primary | ICD-10-CM

## 2023-01-03 PROCEDURE — 99214 OFFICE O/P EST MOD 30 MIN: CPT | Mod: 95,,, | Performed by: NEUROLOGICAL SURGERY

## 2023-01-03 PROCEDURE — 99214 PR OFFICE/OUTPT VISIT, EST, LEVL IV, 30-39 MIN: ICD-10-PCS | Mod: 95,,, | Performed by: NEUROLOGICAL SURGERY

## 2023-01-22 ENCOUNTER — OUTSIDE PLACE OF SERVICE (OUTPATIENT)
Dept: CARDIOLOGY | Facility: CLINIC | Age: 53
End: 2023-01-22
Payer: MEDICAID

## 2023-01-22 PROCEDURE — 93010 ELECTROCARDIOGRAM REPORT: CPT | Mod: ,,, | Performed by: INTERNAL MEDICINE

## 2023-01-22 PROCEDURE — 93010 PR ELECTROCARDIOGRAM REPORT: ICD-10-PCS | Mod: ,,, | Performed by: INTERNAL MEDICINE

## 2023-03-07 NOTE — PROGRESS NOTES
Neurosurgery  Established Patient    SUBJECTIVE:     History of Present Illness:  Patient comes back to see me follow-up after our last evaluation the patient on 09/13/2022.  This is a patient that had a previous L5-S1 lumbar interbody fusion in 2021.  This was done at outside facility.  Patient continued to have lower back pain and left leg pain.  Patient had left L5-S1 injection that gave him about a week's worth of relief.  Patient denies any right-sided symptoms.    Review of patient's allergies indicates:   Allergen Reactions    Keflex [cephalexin] Hives    Norco [hydrocodone-acetaminophen]        Current Outpatient Medications   Medication Sig Dispense Refill    apixaban (ELIQUIS) 5 mg Tab Take 1 tablet by mouth 2 (two) times daily.      ARIPiprazole (ABILIFY) 5 MG Tab Take by mouth.      atorvastatin (LIPITOR) 20 MG tablet Take 1 tablet (20 mg total) by mouth once daily. 90 tablet 3    blood sugar diagnostic Strp use once daily 50 strip 3    blood-glucose meter (ACCU-CHEK CAROL PLUS METER) Misc 1 Device by Misc.(Non-Drug; Combo Route) route once daily. 1 each 0    buPROPion (WELLBUTRIN SR) 150 MG TBSR 12 hr tablet TAKE 1 TABLET BY MOUTH TWICE DAILY (Patient not taking: Reported on 9/13/2022) 60 tablet 0    DULoxetine (CYMBALTA) 60 MG capsule Take 60 mg by mouth once daily.      EScitalopram oxalate (LEXAPRO) 20 MG tablet Take 20 mg by mouth once daily.      furosemide (LASIX) 40 MG tablet Take 1 tablet by mouth once daily.      gabapentin (NEURONTIN) 800 MG tablet Take 1 tablet (800 mg total) by mouth 3 (three) times daily. 270 tablet 0    meloxicam (MOBIC) 7.5 MG tablet       metFORMIN (GLUCOPHAGE) 1000 MG tablet Take 1 tablet (1,000 mg total) by mouth 2 (two) times daily with meals. 180 tablet 3    methocarbamoL (ROBAXIN) 750 MG Tab       metoprolol tartrate (LOPRESSOR) 50 MG tablet Take 50 mg by mouth 2 (two) times daily.      nicotine (NICODERM CQ) 21 mg/24 hr Place 1 patch onto the skin once daily.  (Patient not taking: No sig reported) 28 patch 0    nicotine polacrilex (NICORETTE) 4 MG Gum Take 1 each (4 mg total) by mouth as needed (as needed). Dispence FRUIT flavor please. Can take 1-2 per hour in place of a cigarette. (Patient not taking: No sig reported) 190 each 0    oxyCODONE-acetaminophen (PERCOCET)  mg per tablet       pyridoxine, vitamin B6, (B-6) 50 MG Tab Take 50 mg by mouth once daily.      semaglutide (OZEMPIC) 0.25 mg or 0.5 mg(2 mg/1.5 mL) pen injector Inject 0.5 mg into the skin every 7 days. Start with 0.25 mg weekly for 4 weeks and then increase to 0.5 mg if you are tolerating this dose (Patient not taking: Reported on 9/13/2022) 1 pen 11    traZODone (DESYREL) 50 MG tablet Take 2 tablets (100 mg total) by mouth every evening. 60 tablet 11     No current facility-administered medications for this visit.       Past Medical History:   Diagnosis Date    Allergy     Anxiety     Asthma     Back pain     COPD with acute exacerbation     Depression     Diabetes mellitus     Neuromuscular disorder      Past Surgical History:   Procedure Laterality Date    BACK SURGERY      L5-S1 fusion    HIP REPLACEMENT ARTHROPLASTY      I&D scrotal abcess      INJECTION OF ANESTHETIC AGENT AROUND NERVE Left 12/15/2022    Procedure: BLOCK, NERVE LEFT L5 AND S1 SELECTIVE NERBE ROOT BLOCK CONTRAST DIRECT REFERRAL ELIQUIS;  Surgeon: Charles Smith MD;  Location: [facility]IN T;  Service: Pain Management;  Laterality: Left;    JOINT REPLACEMENT      MYELOGRAPHY N/A 12/1/2022    Procedure: Myelogram;  Surgeon: [facility] Diagnostic Provider;  Location: [facility] OR [address] [place] [address];  Service: Radiology;  Laterality: N/A;  Lumbar    SPINE SURGERY       Family History       Problem Relation (Age of Onset)    Dementia Father    No Known Problems Mother, Brother, Brother, Brother    Parkinsonism Father    Psoriasis Son          Social History     Socioeconomic History    Marital status:    Tobacco Use    Smoking status: Every Day      Packs/day: 1.00     Years: 34.00     Pack years: 34.00     Types: Cigarettes     Start date: 1987    Smokeless tobacco: Never    Tobacco comments:     Pt enrolled in the [employer] on 5/11/13 (SCT Member ID # [id number]). Ambulatory referral to Smoking Cessation    Substance and Sexual Activity    Alcohol use: Yes     Comment: socially    Drug use: No    Sexual activity: Yes     Partners: Female       Review of Systems    OBJECTIVE:     Vital Signs     There is no height or weight on file to calculate BMI.    Neurosurgery Physical Exam      Diagnostic Results:  EXAMINATION:  CT MYELOGRAPHY LUMBAR SPINE     CLINICAL HISTORY:  Low back pain, infection suspected;Low back pain, symptoms persist with > 6wks conservative treatment;  Dorsalgia, unspecified     TECHNIQUE:  Low-dose axial, sagittal and coronal reformations are obtained through the lumbar spine following intrathecal infusion of contrast according to conventional myelography protocol.     COMPARISON:  MRI lumbar spine 02/13/2022     CT thoracic and lumbar spine 09/13/2022     CT lumbar spine myelogram 10/21/2021     FINDINGS:  Postsurgical changes of: L5-S1 posterior spinal fusion with decompressive laminectomy.  Partially visualized postop changes of laminectomies in the lower thoracic spine and rere laminotomy at L1 the right.     Alignment: L5 on S1 grade 2 anterolisthesis.     Vertebrae: Vertebral body heights relatively maintained.  No acute fracture.     Posterior elements: No fractures. Facet articulations appear within normal limits.     Discs: Multilevel disc height loss most severe at L5-S1. large marginal osteophytes at L5-S1.  Vacuum disc formation at T12-L1.     Degenerative findings:     T12-L1: Postoperative changes of right hemilaminotomy.  Diffuse disc bulge with superimposed right foraminal protrusion.  Bilateral ligamentum flavum buckling and facet arthropathy result in severe spinal canal stenosis and severe bilateral neural foraminal  narrowing.     Findings similar when compared to CT lumbar spine 09/13/2022, however, have progressed compared to CT lumbar spine myelogram 10/21/2021.     L1-L2: Broad-based posterior disc bulge, ligamentum flavum buckling and bilateral facet arthropathy result in mild spinal canal narrowing and moderate neural foraminal narrowing.     L2-L3: Circumferential disc bulge and bilateral facet arthropathy result in bilateral moderate neural foraminal narrowing.  No significant spinal canal stenosis.     L3-L4: Broad-based disc bulge, bilateral ligamentum flavum buckling and bilateral facet arthropathy result in bilateral moderate neural foraminal narrowing.  No significant spinal canal stenosis.     L4-L5: Evaluation limited secondary to streak artifact from spinal fusion hardware.  Broad-based posterior disc bulge, ligamentum flavum buckling and bilateral facet arthropathy result in bilateral severe neural foraminal narrowing.  Prominence of the epidural fat.  No significant spinal canal stenosis.     L5-S1: A evaluation mildly limited secondary to metallic streak artifact from spinal fusion hardware.  Decompressive laminectomy.  Grade 2 anterolisthesis result in severe spinal canal stenosis and bilateral severe neural foraminal narrowing.     Paraspinal muscles & soft tissues: Unremarkable.     Impression:     Postoperative changes in the lower thoracic spine and at L5-S1 where there is posterior fusion with decompressive laminectomy.     Multilevel lumbar spondylosis most severe at T12-L1 and L5-S1 with severe spinal canal stenosis and bilateral severe neural foraminal narrowing.  Specific details at each level discussed above.    ASSESSMENT/PLAN:     Patient with a history of significant L5-S1 spondylolisthesis had a decompression and posterolateral instrumented fusion done at outside facility.  Looks like patient has an anterior osteophyte bridge at L5-S1 some posterolateral fusion the patient is still severe  foraminal stenosis and continued to have significant radiculopathy.  Patient was unable to get an EMG which I think with the been helpful.  Like to try another selective L5-S1 transforaminal injection.  I think we could consider removal of the hardware and a more thorough left-sided facet and foraminal compression if patient has really good response to the selective nerve root block.        Note dictated with voice recognition software, please excuse any grammatical errors.

## 2023-03-20 ENCOUNTER — TELEPHONE (OUTPATIENT)
Dept: NEUROSURGERY | Facility: CLINIC | Age: 53
End: 2023-03-20
Payer: MEDICAID

## 2023-03-20 NOTE — TELEPHONE ENCOUNTER
Spoke to patient and confirmed time and date for appointment with Sarina. He also knows to be on the lookout for call from IR to schedule his injection

## 2023-03-27 ENCOUNTER — TELEPHONE (OUTPATIENT)
Dept: INTERVENTIONAL RADIOLOGY/VASCULAR | Facility: CLINIC | Age: 53
End: 2023-03-27
Payer: MEDICAID

## 2023-03-27 NOTE — TELEPHONE ENCOUNTER
Spoke to pt on phone, Pt is scheduled on 4/3/2023 for IR procedure.  Preop instructions given, instructed pt to stay off Eliquis for 2 days, pt verbally understood. Pt aware and confirmed, Thanks

## 2023-04-03 ENCOUNTER — HOSPITAL ENCOUNTER (OUTPATIENT)
Dept: INTERVENTIONAL RADIOLOGY/VASCULAR | Facility: HOSPITAL | Age: 53
Discharge: HOME | End: 2023-04-03
Attending: NEUROLOGICAL SURGERY
Payer: MEDICAID

## 2023-04-03 VITALS
OXYGEN SATURATION: 90 % | SYSTOLIC BLOOD PRESSURE: 179 MMHG | HEART RATE: 93 BPM | RESPIRATION RATE: 15 BRPM | DIASTOLIC BLOOD PRESSURE: 90 MMHG

## 2023-04-03 DIAGNOSIS — M54.16 LUMBAR RADICULOPATHY: ICD-10-CM

## 2023-04-03 PROCEDURE — 64483 NJX AA&/STRD TFRM EPI L/S 1: CPT | Performed by: RADIOLOGY

## 2023-04-03 PROCEDURE — 25500020 PHARM REV CODE 255: Performed by: RADIOLOGY

## 2023-04-03 PROCEDURE — 64483 IR EPIDURAL TRANSFORAMINAL INJ 1ST VERT LUMBAR BILAT: ICD-10-PCS | Mod: LT,,, | Performed by: RADIOLOGY

## 2023-04-03 RX ADMIN — IOHEXOL 10 ML: 300 INJECTION, SOLUTION INTRAVENOUS at 09:04

## 2023-04-03 NOTE — H&P
Radiology History & Physical      SUBJECTIVE:     Chief Complaint: Lumbar radiculopathy     History of Present Illness:  Antonino Austin is a 52 y.o. male with L5-S1 spondylolisthesis S/p decompression and posterolateral instrumented fusion - with persistent concerns for radiculopathy, related to anterior osteophyte bridge resulting in severe foraminal stenosis. Referred for left L5-S1 transforaminal epidural steroid / lidocaine injections for further management and symptomatology control.    Past Medical History:   Diagnosis Date    Allergy     Anxiety     Asthma     Back pain     COPD with acute exacerbation     Depression     Diabetes mellitus     Neuromuscular disorder      Past Surgical History:   Procedure Laterality Date    BACK SURGERY      L5-S1 fusion    HIP REPLACEMENT ARTHROPLASTY      I&D scrotal abcess      INJECTION OF ANESTHETIC AGENT AROUND NERVE Left 12/15/2022    Procedure: BLOCK, NERVE LEFT L5 AND S1 SELECTIVE NERBE ROOT BLOCK CONTRAST DIRECT REFERRAL ELIQUIS;  Surgeon: Charles Smith MD;  Location: [facility] PAIN T;  Service: Pain Management;  Laterality: Left;    JOINT REPLACEMENT      MYELOGRAPHY N/A 12/1/2022    Procedure: Myelogram;  [facility];  Location: [facility] OR [address] [place] [address];  Service: Radiology;  Laterality: N/A;  Lumbar    SPINE SURGERY         Home Meds:   Prior to Admission medications    Medication Sig Start Date End Date Taking? Authorizing Provider   apixaban (ELIQUIS) 5 mg Tab Take 1 tablet by mouth 2 (two) times daily. 5/30/22   Historical Provider   ARIPiprazole (ABILIFY) 5 MG Tab Take by mouth. 9/8/22   Historical Provider   atorvastatin (LIPITOR) 20 MG tablet Take 1 tablet (20 mg total) by mouth once daily. 7/16/21 11/22/22  Maryjane Sen MD   blood sugar diagnostic Strp use once daily 5/25/21   Rajani Birmingham MD   blood-glucose meter (ACCU-CHEK CAROL PLUS METER) Misc 1 Device by Misc.(Non-Drug; Combo Route) route once daily. 5/25/21 5/25/22  Rajani VILLANUEVA  MD Vinnie   buPROPion (WELLBUTRIN SR) 150 MG TBSR 12 hr tablet TAKE 1 TABLET BY MOUTH TWICE DAILY  Patient not taking: Reported on 9/13/2022 11/18/21   Jason Mendiola MD   DULoxetine (CYMBALTA) 60 MG capsule Take 60 mg by mouth once daily. 9/12/22   Historical Provider   EScitalopram oxalate (LEXAPRO) 20 MG tablet Take 20 mg by mouth once daily. 5/12/22   Historical Provider   furosemide (LASIX) 40 MG tablet Take 1 tablet by mouth once daily. 5/30/22 12/1/22  Historical Provider   gabapentin (NEURONTIN) 800 MG tablet Take 1 tablet (800 mg total) by mouth 3 (three) times daily. 2/15/22 12/1/22  Gilmar Tee DO   meloxicam (MOBIC) 7.5 MG tablet  8/13/22   Historical Provider   metFORMIN (GLUCOPHAGE) 1000 MG tablet Take 1 tablet (1,000 mg total) by mouth 2 (two) times daily with meals. 7/16/21 12/1/22  Maryjane Sen MD   methocarbamoL (ROBAXIN) 750 MG Tab  8/13/22   Historical Provider   metoprolol tartrate (LOPRESSOR) 50 MG tablet Take 50 mg by mouth 2 (two) times daily. 8/31/22   Historical Provider   nicotine (NICODERM CQ) 21 mg/24 hr Place 1 patch onto the skin once daily.  Patient not taking: No sig reported 12/9/21   Jason Mendiola MD   nicotine polacrilex (NICORETTE) 4 MG Gum Take 1 each (4 mg total) by mouth as needed (as needed). Dispence FRUIT flavor please. Can take 1-2 per hour in place of a cigarette.  Patient not taking: No sig reported 12/9/21   Jason Mendiola MD   oxyCODONE-acetaminophen (PERCOCET)  mg per tablet  6/23/22   Historical Provider   pyridoxine, vitamin B6, (B-6) 50 MG Tab Take 50 mg by mouth once daily.    Historical Provider   semaglutide (OZEMPIC) 0.25 mg or 0.5 mg(2 mg/1.5 mL) pen injector Inject 0.5 mg into the skin every 7 days. Start with 0.25 mg weekly for 4 weeks and then increase to 0.5 mg if you are tolerating this dose  Patient not taking: Reported on 9/13/2022 7/16/21   Maryjane Sen MD   traZODone (DESYREL) 50 MG tablet Take 2 tablets (100 mg total) by  mouth every evening. 6/18/21 9/13/22  Rajani Birmingham MD     Anticoagulants/Antiplatelets: no anticoagulation    Allergies:   Review of patient's allergies indicates:   Allergen Reactions    Keflex [cephalexin] Hives    Norco [hydrocodone-acetaminophen]      Sedation History:  no adverse reactions    Review of Systems:   Hematological: no known coagulopathies  Respiratory: no shortness of breath  Cardiovascular: no chest pain  Gastrointestinal: no abdominal pain  Genito-Urinary: no dysuria  Musculoskeletal: negative  Neurological: no TIA or stroke symptoms         OBJECTIVE:     Vital Signs (Most Recent)       Physical Exam:    General: no acute distress  Mental Status: alert and oriented to person, place and time  HEENT: normocephalic, atraumatic  Chest: unlabored breathing  Heart: regular heart rate  Abdomen: nondistended  Extremity: moves all extremities    Laboratory  No results found for: INR    Lab Results   Component Value Date    WBC 11.80 09/14/2022    HGB 11.9 (L) 09/14/2022    HCT 39.0 (L) 09/14/2022    MCV 93 09/14/2022     09/14/2022      Lab Results   Component Value Date     (H) 09/14/2022     09/14/2022    K 3.9 09/14/2022    CL 96 09/14/2022    CO2 38 (H) 09/14/2022    BUN 14 09/14/2022    CREATININE 0.53 09/14/2022    CALCIUM 8.3 (L) 09/14/2022    MG 1.8 08/02/2021    ALT 24 09/14/2022    AST 21 09/14/2022    ALBUMIN 3.4 (L) 09/14/2022    BILITOT 0.6 09/14/2022    BILIDIR 0.3 08/02/2021       ASSESSMENT/PLAN:     Sedation Plan: Local anesthesia    Patient will undergo: left L5-S1 transforaminal epidural steroid / lidocaine injections for further management and symptomatology control.          Amilcar Hollingsworth MD     Neuro Endovascular Surgery Fellow   [facility]

## 2023-04-03 NOTE — PLAN OF CARE
Pt arrived to IR 4 for outpt CANDY. Pt in no acute distress, placed on monitor, VSS. Awaiting consent.

## 2023-04-03 NOTE — PROCEDURES
Radiology Post-Procedure Note    Pre Op Diagnosis: LBP    Post Op Diagnosis: Same    Procedure: Lumbar Transforaminal CANDY    Procedure performed by: Husam Hernández MD    Written Informed Consent Obtained: Yes    Specimen Removed: NO    Estimated Blood Loss: Minimal    Findings:     Level injected: L5-S1  Needle used: 21 gauge (Green Chiba / 15 cm)  Dose:  40 mg Depo-methylprednisolone   2 mL Lidocaine 1% MPF    Patient tolerated procedure well.        Amilcar Hollingsworth MD     Neuro Endovascular Surgery Fellow   [facility]

## 2023-04-03 NOTE — DISCHARGE INSTRUCTIONS
Please call with any questions or concerns.      Monday thru Friday 8:00 am - 4:30 pm    Interventional Radiology   [phone]    After Hours    Ask for the Radiology Intern on call  [phone]

## 2023-05-02 ENCOUNTER — OFFICE VISIT (OUTPATIENT)
Dept: NEUROSURGERY | Facility: CLINIC | Age: 53
End: 2023-05-02
Payer: MEDICAID

## 2023-05-02 VITALS — DIASTOLIC BLOOD PRESSURE: 95 MMHG | HEART RATE: 72 BPM | SYSTOLIC BLOOD PRESSURE: 152 MMHG

## 2023-05-02 DIAGNOSIS — E66.2 OBESITY HYPOVENTILATION SYNDROME: ICD-10-CM

## 2023-05-02 DIAGNOSIS — M54.16 LUMBAR RADICULOPATHY: Primary | ICD-10-CM

## 2023-05-02 PROCEDURE — 99999 PR PBB SHADOW E&M-EST. PATIENT-LVL III: CPT | Mod: PBBFAC,,, | Performed by: PHYSICIAN ASSISTANT

## 2023-05-02 PROCEDURE — 3077F SYST BP >= 140 MM HG: CPT | Mod: CPTII,,, | Performed by: PHYSICIAN ASSISTANT

## 2023-05-02 PROCEDURE — 99214 OFFICE O/P EST MOD 30 MIN: CPT | Mod: S$PBB,,, | Performed by: PHYSICIAN ASSISTANT

## 2023-05-02 PROCEDURE — 3080F PR MOST RECENT DIASTOLIC BLOOD PRESSURE >= 90 MM HG: ICD-10-PCS | Mod: CPTII,,, | Performed by: PHYSICIAN ASSISTANT

## 2023-05-02 PROCEDURE — 99999 PR PBB SHADOW E&M-EST. PATIENT-LVL III: ICD-10-PCS | Mod: PBBFAC,,, | Performed by: PHYSICIAN ASSISTANT

## 2023-05-02 PROCEDURE — 1159F MED LIST DOCD IN RCRD: CPT | Mod: CPTII,,, | Performed by: PHYSICIAN ASSISTANT

## 2023-05-02 PROCEDURE — 99214 PR OFFICE/OUTPT VISIT, EST, LEVL IV, 30-39 MIN: ICD-10-PCS | Mod: S$PBB,,, | Performed by: PHYSICIAN ASSISTANT

## 2023-05-02 PROCEDURE — 3080F DIAST BP >= 90 MM HG: CPT | Mod: CPTII,,, | Performed by: PHYSICIAN ASSISTANT

## 2023-05-02 PROCEDURE — 3077F PR MOST RECENT SYSTOLIC BLOOD PRESSURE >= 140 MM HG: ICD-10-PCS | Mod: CPTII,,, | Performed by: PHYSICIAN ASSISTANT

## 2023-05-02 PROCEDURE — 99213 OFFICE O/P EST LOW 20 MIN: CPT | Mod: PBBFAC | Performed by: PHYSICIAN ASSISTANT

## 2023-05-02 PROCEDURE — 1159F PR MEDICATION LIST DOCUMENTED IN MEDICAL RECORD: ICD-10-PCS | Mod: CPTII,,, | Performed by: PHYSICIAN ASSISTANT

## 2023-05-02 NOTE — PROGRESS NOTES
Neurosurgery  Established Patient    SUBJECTIVE:     History of Present Illness (Dr. Irizarry 1/3/23):  Patient comes back to see me follow-up after our last evaluation the patient on 09/13/2022.  This is a patient that had a previous L5-S1 lumbar interbody fusion in 2021.  This was done at outside facility.  Patient continued to have lower back pain and left leg pain.  Patient had left L5-S1 injection that gave him about a week's worth of relief.  Patient denies any right-sided symptoms.    Interval history:  Pt with history of COPD, CHF, AF on eliquis, morbid obesity and DM who presents for follow up after another selective L5-S1 TF injection on the left. He was last seen in clinic by Dr. Irizarry who was considering removal of the hardware and redo decompression of he had a good response to the block. Pt reports he did not get any relief from his most recent injection. His chronic back pain is unchanged but he reports he is now experiencing bilateral posterior leg pain for the past 3 months. He denies new LE weakness and has baseline BLE neuropathy. The pain is unchanged with standing, sitting, bending or lying down. He currently takes percocet 10mg BID, robaxin and gabapentin without much relief. He was previously followed by [place] pain specialists who referred him [facility] to [facility]. He currently smokes 1ppd.       Review of patient's allergies indicates:   Allergen Reactions    Keflex [cephalexin] Hives    Norco [hydrocodone-acetaminophen]        Current Outpatient Medications   Medication Sig Dispense Refill    apixaban (ELIQUIS) 5 mg Tab Take 1 tablet by mouth 2 (two) times daily.      ARIPiprazole (ABILIFY) 5 MG Tab Take by mouth.      atorvastatin (LIPITOR) 20 MG tablet Take 1 tablet (20 mg total) by mouth once daily. 90 tablet 3    blood sugar diagnostic Strp use once daily 50 strip 3    blood-glucose meter (ACCU-CHEK CAROL PLUS METER) Misc 1 Device by Misc.(Non-Drug; Combo Route) route once daily. 1 each 0    buPROPion  (WELLBUTRIN SR) 150 MG TBSR 12 hr tablet TAKE 1 TABLET BY MOUTH TWICE DAILY (Patient not taking: Reported on 9/13/2022) 60 tablet 0    DULoxetine (CYMBALTA) 60 MG capsule Take 60 mg by mouth once daily.      EScitalopram oxalate (LEXAPRO) 20 MG tablet Take 20 mg by mouth once daily.      furosemide (LASIX) 40 MG tablet Take 1 tablet by mouth once daily.      gabapentin (NEURONTIN) 800 MG tablet Take 1 tablet (800 mg total) by mouth 3 (three) times daily. 270 tablet 0    meloxicam (MOBIC) 7.5 MG tablet       metFORMIN (GLUCOPHAGE) 1000 MG tablet Take 1 tablet (1,000 mg total) by mouth 2 (two) times daily with meals. 180 tablet 3    methocarbamoL (ROBAXIN) 750 MG Tab       metoprolol tartrate (LOPRESSOR) 50 MG tablet Take 50 mg by mouth 2 (two) times daily.      nicotine (NICODERM CQ) 21 mg/24 hr Place 1 patch onto the skin once daily. (Patient not taking: No sig reported) 28 patch 0    nicotine polacrilex (NICORETTE) 4 MG Gum Take 1 each (4 mg total) by mouth as needed (as needed). Dispence FRUIT flavor please. Can take 1-2 per hour in place of a cigarette. (Patient not taking: No sig reported) 190 each 0    oxyCODONE-acetaminophen (PERCOCET)  mg per tablet       pyridoxine, vitamin B6, (B-6) 50 MG Tab Take 50 mg by mouth once daily.      semaglutide (OZEMPIC) 0.25 mg or 0.5 mg(2 mg/1.5 mL) pen injector Inject 0.5 mg into the skin every 7 days. Start with 0.25 mg weekly for 4 weeks and then increase to 0.5 mg if you are tolerating this dose (Patient not taking: Reported on 9/13/2022) 1 pen 11    traZODone (DESYREL) 50 MG tablet Take 2 tablets (100 mg total) by mouth every evening. 60 tablet 11     No current facility-administered medications for this visit.       Past Medical History:   Diagnosis Date    Allergy     Anxiety     Asthma     Back pain     COPD with acute exacerbation     Depression     Diabetes mellitus     Neuromuscular disorder      Past Surgical History:   Procedure Laterality Date    BACK  SURGERY      L5-S1 fusion    HIP REPLACEMENT ARTHROPLASTY      I&D scrotal abcess      INJECTION OF ANESTHETIC AGENT AROUND NERVE Left 12/15/2022    Procedure: BLOCK, NERVE LEFT L5 AND S1 SELECTIVE NERBE ROOT BLOCK CONTRAST DIRECT REFERRAL TALA;  Surgeon: Charles Smith MD;  Location: [facility]IN [facility];  Service: Pain Management;  Laterality: Left;    JOINT REPLACEMENT      MYELOGRAPHY N/A 12/1/2022    Procedure: Myelogram;  Surgeon: Dosc Diagnostic Provider;  Location: [facility] OR [address] [place] [address];  Service: Radiology;  Laterality: N/A;  Lumbar    SPINE SURGERY       Family History       Problem Relation (Age of Onset)    Dementia Father    No Known Problems Mother, Brother, Brother, Brother    Parkinsonism Father    Psoriasis Son          Social History     Socioeconomic History    Marital status:    Tobacco Use    Smoking status: Every Day     Packs/day: 1.00     Years: 34.00     Pack years: 34.00     Types: Cigarettes     Start date: 1987    Smokeless tobacco: Never    Tobacco comments:     Pt enrolled in the [employer] on 5/11/13 (SCT Member ID # [id number]). Ambulatory referral to Smoking Cessation    Substance and Sexual Activity    Alcohol use: Yes     Comment: socially    Drug use: No    Sexual activity: Yes     Partners: Female       Review of Systems    OBJECTIVE:     Vital Signs     There is no height or weight on file to calculate BMI.    Neurosurgery Physical Exam  General: well developed, morbidly obese, no distress.   Head: normocephalic, atraumatic  Neurologic: Alert and oriented. Thought content appropriate.  GCS: Motor: 6/Verbal: 5/Eyes: 4 GCS Total: 15  Mental Status: Awake, Alert, Oriented x3  Cranial nerves: face symmetric, tongue midline, CN II-XII grossly intact.   Eyes: pupils equal, round, reactive to light with accomodation, EOMI.    Sensory: decreased to light touch below the knee bilaterally  Motor Strength: Moves all extremities spontaneously with good tone.  Full strength upper and lower  extremities. No abnormal movements seen.   DTR's - 2 + and symmetric in UE and LE  [place]: absent  Clonus: absent  Pulses: 2+ and symmetric radial and dorsalis pedis. 2+ BLE edema  Straight leg raise: positive bilaterally  Gait: normal    SI Joint tenderness: Negative   Pain on Hip ROM: Negative   TTP GTB bilaterally    Diagnostic Results:  EXAMINATION:  CT MYELOGRAPHY LUMBAR SPINE     CLINICAL HISTORY:  Low back pain, infection suspected;Low back pain, symptoms persist with > 6wks conservative treatment;  Dorsalgia, unspecified     TECHNIQUE:  Low-dose axial, sagittal and coronal reformations are obtained through the lumbar spine following intrathecal infusion of contrast according to conventional myelography protocol.     COMPARISON:  MRI lumbar spine 02/13/2022     CT thoracic and lumbar spine 09/13/2022     CT lumbar spine myelogram 10/21/2021     FINDINGS:  Postsurgical changes of: L5-S1 posterior spinal fusion with decompressive laminectomy.  Partially visualized postop changes of laminectomies in the lower thoracic spine and rere laminotomy at L1 the right.     Alignment: L5 on S1 grade 2 anterolisthesis.     Vertebrae: Vertebral body heights relatively maintained.  No acute fracture.     Posterior elements: No fractures. Facet articulations appear within normal limits.     Discs: Multilevel disc height loss most severe at L5-S1. large marginal osteophytes at L5-S1.  Vacuum disc formation at T12-L1.     Degenerative findings:     T12-L1: Postoperative changes of right hemilaminotomy.  Diffuse disc bulge with superimposed right foraminal protrusion.  Bilateral ligamentum flavum buckling and facet arthropathy result in severe spinal canal stenosis and severe bilateral neural foraminal narrowing.     Findings similar when compared to CT lumbar spine 09/13/2022, however, have progressed compared to CT lumbar spine myelogram 10/21/2021.     L1-L2: Broad-based posterior disc bulge, ligamentum flavum buckling and  bilateral facet arthropathy result in mild spinal canal narrowing and moderate neural foraminal narrowing.     L2-L3: Circumferential disc bulge and bilateral facet arthropathy result in bilateral moderate neural foraminal narrowing.  No significant spinal canal stenosis.     L3-L4: Broad-based disc bulge, bilateral ligamentum flavum buckling and bilateral facet arthropathy result in bilateral moderate neural foraminal narrowing.  No significant spinal canal stenosis.     L4-L5: Evaluation limited secondary to streak artifact from spinal fusion hardware.  Broad-based posterior disc bulge, ligamentum flavum buckling and bilateral facet arthropathy result in bilateral severe neural foraminal narrowing.  Prominence of the epidural fat.  No significant spinal canal stenosis.     L5-S1: A evaluation mildly limited secondary to metallic streak artifact from spinal fusion hardware.  Decompressive laminectomy.  Grade 2 anterolisthesis result in severe spinal canal stenosis and bilateral severe neural foraminal narrowing.     Paraspinal muscles & soft tissues: Unremarkable.     Impression:     Postoperative changes in the lower thoracic spine and at L5-S1 where there is posterior fusion with decompressive laminectomy.     Multilevel lumbar spondylosis most severe at T12-L1 and L5-S1 with severe spinal canal stenosis and bilateral severe neural foraminal narrowing.  Specific details at each level discussed above.    ASSESSMENT/PLAN:     Patient with a history of significant L5-S1 spondylolisthesis who had a decompression and posterolateral instrumented fusion done at an outside facility.  Looks like patient has an anterior osteophyte bridge at L5-S1 some posterolateral fusion the patient is still severe foraminal stenosis and continued to have significant radiculopathy.  Patient was unable to get an EMG and failed selective L5-S1 transforaminal injections on the left. Unfortunately, the patient is a poor surgical candidate  due to his habitus, COPD, DM and cardiac history. We discussed the importance of weight loss and smoking cessation for back health and for successful surgery. I will refer him to bariatrics at [facility] and work on obtaining resources for weight loss. I would also like him to get established with a pulmonologist and optimized from a cardiopulmonary perspective prior to consideration for surgery. He expressed understanding and was encouraged to call us with any questions or concerns.      Sarina Bustillo PA-C  Neurosurgery          Note dictated with voice recognition software, please excuse any grammatical errors.

## 2023-05-03 ENCOUNTER — PATIENT MESSAGE (OUTPATIENT)
Dept: NEUROSURGERY | Facility: CLINIC | Age: 53
End: 2023-05-03
Payer: MEDICAID

## 2023-05-04 DIAGNOSIS — M54.9 DORSALGIA, UNSPECIFIED: ICD-10-CM

## 2023-05-04 DIAGNOSIS — G89.18 POST-OPERATIVE PAIN: ICD-10-CM

## 2023-05-04 DIAGNOSIS — Z98.890 HISTORY OF LUMBAR LAMINECTOMY FOR SPINAL CORD DECOMPRESSION: Primary | ICD-10-CM

## 2023-05-04 RX ORDER — GABAPENTIN 300 MG/1
900 CAPSULE ORAL 3 TIMES DAILY
Qty: 270 CAPSULE | Refills: 11 | Status: SHIPPED | OUTPATIENT
Start: 2023-05-04 | End: 2024-05-03

## 2025-02-25 ENCOUNTER — LAB VISIT (OUTPATIENT)
Dept: LAB | Facility: HOSPITAL | Age: 55
End: 2025-02-25
Attending: INTERNAL MEDICINE
Payer: MEDICARE

## 2025-02-25 DIAGNOSIS — E03.9 MYXEDEMA HEART DISEASE: Primary | ICD-10-CM

## 2025-02-25 DIAGNOSIS — I51.9 MYXEDEMA HEART DISEASE: Primary | ICD-10-CM

## 2025-02-25 LAB
T3 SERPL-MCNC: 92 NG/DL (ref 60–180)
T4 FREE SERPL-MCNC: 1.16 NG/DL (ref 0.71–1.51)
THYROGLOB AB SERPL IA-ACNC: <4 IU/ML (ref 0–3.9)
TSH SERPL DL<=0.005 MIU/L-ACNC: 2.57 UIU/ML (ref 0.4–4)

## 2025-02-25 PROCEDURE — 84439 ASSAY OF FREE THYROXINE: CPT | Performed by: INTERNAL MEDICINE

## 2025-02-25 PROCEDURE — 36415 COLL VENOUS BLD VENIPUNCTURE: CPT | Mod: PN | Performed by: INTERNAL MEDICINE

## 2025-02-25 PROCEDURE — 84443 ASSAY THYROID STIM HORMONE: CPT | Mod: PN | Performed by: INTERNAL MEDICINE

## 2025-02-25 PROCEDURE — 86800 THYROGLOBULIN ANTIBODY: CPT | Mod: PN | Performed by: INTERNAL MEDICINE

## 2025-02-25 PROCEDURE — 84480 ASSAY TRIIODOTHYRONINE (T3): CPT | Mod: PN | Performed by: INTERNAL MEDICINE

## 2025-06-25 ENCOUNTER — LAB VISIT (OUTPATIENT)
Dept: LAB | Facility: HOSPITAL | Age: 55
End: 2025-06-25
Attending: INTERNAL MEDICINE
Payer: MEDICARE

## 2025-06-25 DIAGNOSIS — E03.9 HYPOTHYROIDISM, ADULT: Primary | ICD-10-CM

## 2025-06-25 LAB
T3FREE SERPL-MCNC: 2.6 PG/ML (ref 2.3–4.2)
T4 FREE SERPL-MCNC: 1.09 NG/DL (ref 0.71–1.51)
TSH SERPL-ACNC: 3.88 UIU/ML (ref 0.4–4)

## 2025-06-25 PROCEDURE — 84481 FREE ASSAY (FT-3): CPT | Mod: PN

## 2025-06-25 PROCEDURE — 84439 ASSAY OF FREE THYROXINE: CPT | Mod: PN

## 2025-06-25 PROCEDURE — 84443 ASSAY THYROID STIM HORMONE: CPT | Mod: PN

## 2025-06-25 PROCEDURE — 36415 COLL VENOUS BLD VENIPUNCTURE: CPT | Mod: PN

## (undated) DEVICE — DRESSING LEUKOPLAST FLEX 1X3IN